# Patient Record
Sex: FEMALE | Race: BLACK OR AFRICAN AMERICAN | Employment: STUDENT | URBAN - METROPOLITAN AREA
[De-identification: names, ages, dates, MRNs, and addresses within clinical notes are randomized per-mention and may not be internally consistent; named-entity substitution may affect disease eponyms.]

---

## 2018-01-31 ENCOUNTER — OFFICE VISIT (OUTPATIENT)
Dept: CARDIOLOGY CLINIC | Age: 18
End: 2018-01-31

## 2018-01-31 VITALS
WEIGHT: 160 LBS | OXYGEN SATURATION: 98 % | HEIGHT: 62 IN | HEART RATE: 85 BPM | DIASTOLIC BLOOD PRESSURE: 69 MMHG | SYSTOLIC BLOOD PRESSURE: 116 MMHG | BODY MASS INDEX: 29.44 KG/M2

## 2018-01-31 DIAGNOSIS — R00.2 PALPITATIONS: Primary | ICD-10-CM

## 2018-01-31 DIAGNOSIS — R07.89 OTHER CHEST PAIN: ICD-10-CM

## 2018-01-31 NOTE — PROGRESS NOTES
Subjective:   Quiana Simms is a 21 y.o. F who presents to the office today for cardiac evaluation. She reports that over the past 3 days, she has been having episodes of chest pressure/tightness associated with shortness of breath that start during/after a test.  She reports that walking, lying down, and applying Toribio's rub to her chest all help alleviate her symptoms. 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 She thinks that these episodes could be related to anxiety. She states that she has panic attacks with the chest tightness and shortness of breath but also with racing thoughts and palpitations. Patient's cardiac risk factors are family history (grandfather with Hx MI in late 52's), stress. There are no active problems to display for this patient. No Known Allergies  No past medical history on file. No past surgical history on file. Family History   Problem Relation Age of Onset    Heart Attack Other      History   Smoking Status    Never Smoker   Smokeless Tobacco    Never Used          Review of Systems, additional:  Constitutional: negative  Eyes: negative  Respiratory: As per HPI  Cardiovascular: As per HPI  Gastrointestinal: negative  Musculoskeletal:negative  Neurological: negative  Behvioral/Psych: As per HPI  Endocrine: negative  ENT: negative    Objective:     Visit Vitals    /69    Pulse 85    Ht 5' 2\" (1.575 m)    Wt 160 lb (72.6 kg)    LMP 12/28/2017    SpO2 98%    BMI 29.26 kg/m2     General:  alert, cooperative, no distress, appears stated age   Chest Wall: inspection normal - no chest wall deformities, respiratory effort normal   Lung: clear to auscultation bilaterally   Heart:  normal rate, regular rhythm, normal S1, S2, no murmurs, rubs, clicks or gallops   Abdomen: soft, non-tender.  No masses,  no organomegaly   Extremities: extremities normal, atraumatic, no cyanosis or edema Skin: no rashes   Neuro: alert, oriented, normal speech, no focal findings or movement disorder noted     EKG: Normal sinus rhythm, rate 71    Assessment/Plan:   Pt is a 20 yo F who presents due to chest tightness. ICD-10-CM ICD-9-CM    1. Palpitations R00.2 785.1 AMB POC EKG ROUTINE W/ 12 LEADS, INTER & REP      STRESS TEST CARDIAC   2. Other chest pain R07.89 786.59 STRESS TEST CARDIAC     Chest pain/tightness: Occurs during times of stress/testing for school. Atypical for coronary disease. EKG obtained today, unremarkable. Exercise stress test ordered. Follow-up in 2-3 weeks, after testing has been completed.

## 2018-01-31 NOTE — PROGRESS NOTES
1. Have you been to the ER, urgent care clinic since your last visit? Hospitalized since your last visit? No    2. Have you seen or consulted any other health care providers outside of the [address] [place] [address] since your last visit? Include any pap smears or colon screening.  No

## 2018-01-31 NOTE — MR AVS SNAPSHOT
[facility] [address] [place] [address] [address] 
[phone] Patient: Nabeel Richardson MRN: [id number] ZQK:[birth year] Visit Information Date & Time Provider Department Dept. Phone Encounter #  
 1/31/2018 11:15 AM Sanford Haywood MD [address] [place] [address] Specialist at [facility] [phone] [id number] Your Appointments 2/12/2018  1:15 PM  
Follow Up with Sanford Haywood MD  
Cardio Specialist at [facility]) Appt Note: after stress test  
 [facility] [address] [address] [address] Upcoming Health Maintenance Date Due Hepatitis A Peds Age 1-18 (1 of 2 - Standard Series) 3/16/1999 DTaP/Tdap/Td series (1 - Tdap) 3/16/2005 HPV AGE 9Y-26Y (1 of 3 - Female 3 Dose Series) 3/16/2009 Influenza Age 5 to Adult 8/1/2017 Allergies as of 1/31/2018  Review Complete On: 1/31/2018 By: Marcia Mary RN No Known Allergies Current Immunizations  Never Reviewed No immunizations on file. Not reviewed this visit You Were Diagnosed With   
  
 Codes Comments Palpitations    -  Primary ICD-10-CM: R00.2 ICD-9-CM: 785.1 Other chest pain     ICD-10-CM: R07.89 ICD-9-CM: 786.59 Vitals BP Pulse Height(growth percentile) Weight(growth percentile) LMP SpO2  
 116/69 (81 %/ 75 %)* 85 5' 2\" (1.575 m) (18 %, Z= -0.90) 160 lb (72.6 kg) (87 %, Z= 1.13) 12/28/2017 98% BMI OB Status Smoking Status 29.26 kg/m2 (92 %, Z= 1.41) Having regular periods Never Smoker *BP percentiles are based on NHBPEP's 4th Report Growth percentiles are based on CDC 2-20 Years data. Vitals History BMI and BSA Data Body Mass Index Body Surface Area  
 29.26 kg/m 2 1.78 m 2 Your Updated Medication List  
  
Notice  As of 1/31/2018 11:59 AM  
 You have not been prescribed any medications. We Performed the Following AMB POC EKG ROUTINE W/ 12 LEADS, INTER & REP [04952 CPT(R)] Introducing [facility] & HEALTH SERVICES! [facility] introduces [employer] patient portal. Now you can access parts of your medical record, email your doctor's office, and request medication refills online. 1. In your internet browser, go to [email or web address] 2. Click on the First Time User? Click Here link in the Sign In box. You will see the New Member Sign Up page. 3. Enter your [employer] Access Code exactly as it appears below. You will not need to use this code after youve completed the sign-up process. If you do not sign up before the expiration date, you must request a new code. · [employer] Access Code: [email or web address] Expires: 5/1/2018 11:27 AM 
 
4. Enter the last four digits of your Social Security Number (xxxx) and Date of Birth (mm/dd/yyyy) as indicated and click Submit. You will be taken to the next sign-up page. 5. Create a [employer]ia ID. This will be your Diagn[employer]ia login ID and cannot be changed, so think of one that is secure and easy to remember. 6. Create a [employer]ia password. You can change your password at any time. 7. Enter your Password Reset Question and Answer. This can be used at a later time if you forget your password. 8. Enter your e-mail address. You will receive e-mail notification when new information is available in [address] 9. Click Sign Up. You can now view and download portions of your medical record. 10. Click the Download Summary menu link to download a portable copy of your medical information. If you have questions, please visit the Frequently Asked Questions section of the [employer] website. Remember, [employer]ia is NOT to be used for urgent needs. For medical emergencies, dial 911. Now available from your iPhone and Android! Please provide this summary of care documentation to your next provider. Your primary care clinician is listed as NONE. If you have any questions after today's visit, please call [phone].

## 2018-02-04 PROBLEM — R07.89 OTHER CHEST PAIN: Status: ACTIVE | Noted: 2018-02-04

## 2018-02-09 ENCOUNTER — TELEPHONE (OUTPATIENT)
Dept: CARDIOLOGY CLINIC | Age: 18
End: 2018-02-09

## 2018-02-09 NOTE — TELEPHONE ENCOUNTER
LMOM for patient to cancel appt on Monday she has not had testing done that Dr. Christiano Bustamante ordered.

## 2018-03-06 ENCOUNTER — OFFICE VISIT (OUTPATIENT)
Dept: INTERNAL MEDICINE CLINIC | Age: 18
End: 2018-03-06

## 2018-03-06 VITALS
OXYGEN SATURATION: 99 % | SYSTOLIC BLOOD PRESSURE: 115 MMHG | RESPIRATION RATE: 15 BRPM | WEIGHT: 157 LBS | HEIGHT: 62 IN | DIASTOLIC BLOOD PRESSURE: 55 MMHG | HEART RATE: 79 BPM | BODY MASS INDEX: 28.89 KG/M2 | TEMPERATURE: 98.3 F

## 2018-03-06 DIAGNOSIS — J45.20 MILD INTERMITTENT ASTHMA, UNSPECIFIED WHETHER COMPLICATED: Primary | ICD-10-CM

## 2018-03-06 DIAGNOSIS — F41.9 ANXIETY: ICD-10-CM

## 2018-03-06 RX ORDER — ALBUTEROL SULFATE 90 UG/1
AEROSOL, METERED RESPIRATORY (INHALATION)
Refills: 0 | COMMUNITY
Start: 2018-02-25

## 2018-03-06 RX ORDER — CETIRIZINE HYDROCHLORIDE, PSEUDOEPHEDRINE HYDROCHLORIDE 5; 120 MG/1; MG/1
1 TABLET, FILM COATED, EXTENDED RELEASE ORAL 2 TIMES DAILY
Qty: 24 TAB | Refills: 4 | Status: SHIPPED | OUTPATIENT
Start: 2018-03-06 | End: 2018-03-07 | Stop reason: SDUPTHER

## 2018-03-06 NOTE — PROGRESS NOTES
FAMILY MEDICINE CLINIC NOTE    S: the patient presents for establishment of care. She states that she had a recent URI with difficulty breathing, wheezing and some mildly productive cough which is worse at night. She has an albuterol inhaler but does not utilize a spacer. She has a history of generalized anxiety. This effects her every day. Intermittent panic attacks that occur in relating to her menstrual period. She has never been treated for anxiety. She would like to try psychotherapy. No angina, dyspnea, edema, temperature intolerance, polyuria or polydipsia         No current outpatient prescriptions on file prior to visit. No current facility-administered medications on file prior to visit. Past Medical History:   Diagnosis Date    Depression        Social History     Social History    Marital status: SINGLE     Spouse name: N/A    Number of children: N/A    Years of education: N/A     Occupational History    Not on file.      Social History Main Topics    Smoking status: Never Smoker    Smokeless tobacco: Never Used    Alcohol use Yes      Comment: occasionally    Drug use: No    Sexual activity: Yes     Birth control/ protection: None, Implant     Other Topics Concern    Not on file     Social History Narrative    No narrative on file       Family History   Problem Relation Age of Onset    Anemia Mother      iron deficiency    Hypertension Father     Other Father      enlarged heart       O:  Visit Vitals    /55    Pulse 79    Temp 98.3 °F (36.8 °C) (Oral)    Resp 15    Ht 5' 1.5\" (1.562 m)    Wt 157 lb (71.2 kg)    SpO2 99%    BMI 29.18 kg/m2     NAD, comfortable  No thyromegaly  No LAD  RRR, no murmurs  CTABL, no wheezing/ronchi/rales  abd soft ND NT normoactive BS  No edema  AAOx3    21 y.o. female      ICD-10-CM ICD-9-CM    1. Mild intermittent asthma, unspecified whether complicated B80.03 809.77 inhalational spacing device      DISCONTINUED: cetirizine-psuedoePHEDrine (ZYRTEC-D) 5-120 mg per tablet   2.  Anxiety F41.9 300.00 REFERRAL TO PSYCHOLOGY

## 2018-03-06 NOTE — MR AVS SNAPSHOT
[address] [place] [address] 
 
 
 [address] [facility] [address] 
[phone] Patient: Abrahan Andersen MRN: [id number] SIF:0[birth year]27/6544 Visit Information Date & Time Provider Department Dept. Phone Encounter #  
 3/6/2018  5:00 PM [address] [phone] [id number] Upcoming Health Maintenance Date Due Hepatitis A Peds Age 1-18 (1 of 2 - Standard Series) 3/15/1999 DTaP/Tdap/Td series (1 - Tdap) 3/15/2005 HPV AGE 9Y-26Y (1 of 3 - Female 3 Dose Series) 3/15/2009 Allergies as of 3/6/2018  Review Complete On: 3/6/2018 By: Amalia Anthony MD  
 No Known Allergies Current Immunizations  Never Reviewed No immunizations on file. Not reviewed this visit You Were Diagnosed With   
  
 Codes Comments Mild intermittent asthma, unspecified whether complicated    -  Primary ICD-10-CM: J45.20 ICD-9-CM: 493.90 Anxiety     ICD-10-CM: F41.9 ICD-9-CM: 300.00 Vitals BP Pulse Temp Resp Height(growth percentile) Weight(growth percentile) 115/55 (80 %/ 28 %)* 79 98.3 °F (36.8 °C) (Oral) 15 5' 1.5\" (1.562 m) (14 %, Z= -1.10) 157 lb (71.2 kg) (85 %, Z= 1.04) LMP SpO2 BMI OB Status Smoking Status 02/27/2018 (Exact Date) 99% 29.18 kg/m2 (92 %, Z= 1.39) Having regular periods Never Smoker *BP percentiles are based on NHBPEP's 4th Report Growth percentiles are based on CDC 2-20 Years data. BMI and BSA Data Body Mass Index Body Surface Area  
 29.18 kg/m 2 1.76 m 2 Preferred Pharmacy Pharmacy Name Phone [id number] [address], [address] [place] [address] [address] [place] [address] [phone] Your Updated Medication List  
  
   
This list is accurate as of 3/6/18  5:11 PM.  Always use your most recent med list.  
  
  
  
  
 Amy Mole EXTRACT(BULK)  
by Does Not Apply route. cetirizine-psuedoePHEDrine 5-120 mg per tablet Commonly known as:  ZyrTEC-D Take 1 Tab by mouth two (2) times a day. DAILY MULTIVITAMIN PO Take  by mouth. FISH -160-1,000 mg Cap Generic drug:  omega 3-dha-epa-fish oil Take  by mouth. inhalational spacing device Use every time you use your inhaler MAGNESIUM PO Take  by mouth. NEXPLANON 68 mg Impl Generic drug:  etonogestrel  
by SubDERmal route. THEANINE PO Take  by mouth. VENTOLIN HFA 90 mcg/actuation inhaler Generic drug:  albuterol INHALE 1 PUFF INHALE EVERY 4 HOURS AS NEEDED (PRN) Prescriptions Sent to Pharmacy Refills  
 inhalational spacing device 0 Sig: Use every time you use your inhaler Class: Normal  
 [id number] [address] [place] [address],[facility] Floor Ph #: [phone]  
 cetirizine-psuedoePHEDrine (ZYRTEC-D) 5-120 mg per tablet 4 Sig: Take 1 Tab by mouth two (2) times a day. Class: Normal  
 Pharmacy: [facility], [address] [place] [address],[address] Ph #: [phone] Route: Oral  
  
We Performed the Following REFERRAL TO PSYCHOLOGY [TUP40 Custom] Comments:  
 Would like counseling services for anxiety DOES NOT WANT PSYCHIATRY REFERRAL AS SHE IS NOT INTERESTED IN MEDICATION MANAGEMENT Referral Information Referral ID Referred By Referred To  
  
 [id number] Sheyla VALDES Not Available Visits Status Start Date End Date 1 New Request 3/6/18 3/6/19 If your referral has a status of pending review or denied, additional information will be sent to support the outcome of this decision. Introducing [facility] & HEALTH SERVICES! Suman Saavedra introduces [employer] patient portal. Now you can access parts of your medical record, email your doctor's office, and request medication refills online. 1. In your internet browser, go to [email or web address] 2. Click on the First Time User? Click Here link in the Sign In box.  You will see the New Member Sign Up page. 3. Enter your [employer] Access Code exactly as it appears below. You will not need to use this code after youve completed the sign-up process. If you do not sign up before the expiration date, you must request a new code. · [employer] Access Code: [email or web address] Expires: 6/4/2018  5:08 PM 
 
4. Enter the last four digits of your Social Security Number (xxxx) and Date of Birth (mm/dd/yyyy) as indicated and click Submit. You will be taken to the next sign-up page. 5. Create a [employer] ID. This will be your [employer] login ID and cannot be changed, so think of one that is secure and easy to remember. 6. Create a [employer] password. You can change your password at any time. 7. Enter your Password Reset Question and Answer. This can be used at a later time if you forget your password. 8. Enter your e-mail address. You will receive e-mail notification when new information is available in [address] 9. Click Sign Up. You can now view and download portions of your medical record. 10. Click the Download Summary menu link to download a portable copy of your medical information. If you have questions, please visit the Frequently Asked Questions section of the [employer] website. Remember, [employer] is NOT to be used for urgent needs. For medical emergencies, dial 911. Now available from your iPhone and Android! Please provide this summary of care documentation to your next provider. If you have any questions after today's visit, please call [phone].

## 2018-03-06 NOTE — PROGRESS NOTES
ROOM # 10    Tye Newsome presents today for   Chief Complaint   Patient presents with    New Patient    Breathing Problem     hx of asthma as a child, cold x1 month ago with lingering mucous       [facility] preferred language for health care discussion is english/other. Is someone accompanying this pt? no    Is the patient using any DME equipment during OV? no    Depression Screening:  PHQ over the last two weeks 3/6/2018   Little interest or pleasure in doing things Several days   Feeling down, depressed or hopeless Nearly every day   Total Score PHQ 2 4       Learning Assessment:  No flowsheet data found. Abuse Screening:  No flowsheet data found. Fall Risk  No flowsheet data found. Health Maintenance reviewed and discussed per provider. Yes    Pt will get peds imm records      Advance Directive:  1. Do you have an advance directive in place? Patient Reply: no    2. If not, would you like material regarding how to put one in place? Patient Reply: no    Coordination of Care:  1. Have you been to the ER, urgent care clinic since your last visit? Hospitalized since your last visit? no    2. Have you seen or consulted any other health care providers outside of the [address] [place] [address] since your last visit? Include any pap smears or colon screening.  no

## 2018-03-07 DIAGNOSIS — J45.20 MILD INTERMITTENT ASTHMA, UNSPECIFIED WHETHER COMPLICATED: ICD-10-CM

## 2018-03-07 RX ORDER — CETIRIZINE HYDROCHLORIDE, PSEUDOEPHEDRINE HYDROCHLORIDE 5; 120 MG/1; MG/1
1 TABLET, FILM COATED, EXTENDED RELEASE ORAL 2 TIMES DAILY
Qty: 24 TAB | Refills: 4 | Status: SHIPPED | OUTPATIENT
Start: 2018-03-07

## 2018-03-07 NOTE — TELEPHONE ENCOUNTER
[facility] pharmacy calling to check on a Prescription. States they did not receive the RX from yesterday, asking that it please be resent.

## 2018-09-20 ENCOUNTER — OFFICE VISIT (OUTPATIENT)
Dept: INTERNAL MEDICINE CLINIC | Age: 18
End: 2018-09-20

## 2018-09-20 VITALS
RESPIRATION RATE: 18 BRPM | DIASTOLIC BLOOD PRESSURE: 76 MMHG | HEIGHT: 62 IN | SYSTOLIC BLOOD PRESSURE: 114 MMHG | BODY MASS INDEX: 30.59 KG/M2 | HEART RATE: 73 BPM | WEIGHT: 166.2 LBS | OXYGEN SATURATION: 98 % | TEMPERATURE: 98.5 F

## 2018-09-20 DIAGNOSIS — K64.9 HEMORRHOIDS, UNSPECIFIED HEMORRHOID TYPE: Primary | ICD-10-CM

## 2018-09-20 RX ORDER — HYDROCORTISONE ACETATE 25 MG/1
25 SUPPOSITORY RECTAL EVERY 12 HOURS
Qty: 100 SUPPOSITORY | Refills: 2 | Status: SHIPPED | OUTPATIENT
Start: 2018-09-20

## 2018-09-20 NOTE — MR AVS SNAPSHOT
[address] [place] [address] 
 
 
 [address] [facility] [address] [address] 
[phone] Patient: Minor Reese MRN: [id number] CSP:[id number] Visit Information Date & Time Provider Department Dept. Phone Encounter #  
 9/20/2018  3:30 PM [address] [phone] [id number] Upcoming Health Maintenance Date Due Hepatitis A Peds Age 1-18 (1 of 2 - Standard Series) 3/16/1999 DTaP/Tdap/Td series (1 - Tdap) 3/16/2005 HPV Age 9Y-34Y (1 of 3 - Female 3 Dose Series) 3/16/2009 Pneumococcal 19-64 Medium Risk (1 of 1 - PPSV23) 3/16/2017 Influenza Age 5 to Adult 8/1/2018 Allergies as of 9/20/2018  Review Complete On: 9/20/2018 By: Brittani Hewitt MD  
 No Known Allergies Current Immunizations  Never Reviewed No immunizations on file. Not reviewed this visit You Were Diagnosed With   
  
 Codes Comments Hemorrhoids, unspecified hemorrhoid type    -  Primary ICD-10-CM: K64.9 ICD-9-CM: 551. 6 Vitals BP Pulse Temp Resp Height(growth percentile) Weight(growth percentile) 114/76 (BP 1 Location: Right arm, BP Patient Position: Sitting) 73 98.5 °F (36.9 °C) (Oral) 18 5' 1.5\" (1.562 m) 166 lb 3.2 oz (75.4 kg) LMP SpO2 BMI OB Status Smoking Status 09/06/2018 (Approximate) 98% 30.89 kg/m2 Having regular periods Never Smoker Vitals History BMI and BSA Data Body Mass Index Body Surface Area  
 30.89 kg/m 2 1.81 m 2 Preferred Pharmacy Pharmacy Name Phone [id number] [address] [place] [address] [phone] Your Updated Medication List  
  
   
This list is accurate as of 9/20/18  3:54 PM.  Always use your most recent med list.  
  
  
  
  
 Ladean Presser EXTRACT(BULK)  
by Does Not Apply route. cetirizine-psuedoePHEDrine 5-120 mg per tablet Commonly known as:  ZyrTEC-D Take 1 Tab by mouth two (2) times a day. DAILY MULTIVITAMIN PO Take  by mouth. FISH -160-1,000 mg Cap Generic drug:  omega 3-dha-epa-fish oil Take  by mouth. hydrocortisone 25 mg Supp Commonly known as:  ANUSOL-HC Insert 1 Suppository into rectum every twelve (12) hours. inhalational spacing device Use every time you use your inhaler MAGNESIUM PO Take  by mouth. NEXPLANON 68 mg Impl Generic drug:  etonogestrel  
by SubDERmal route. THEANINE PO Take  by mouth. VENTOLIN HFA 90 mcg/actuation inhaler Generic drug:  albuterol INHALE 1 PUFF INHALE EVERY 4 HOURS AS NEEDED (PRN) Prescriptions Sent to Pharmacy Refills  
 hydrocortisone (ANUSOL-HC) 25 mg supp 2 Sig: Insert 1 Suppository into rectum every twelve (12) hours. Class: Normal  
 Pharmacy: [facility] [address] [facility]  #: [phone] Route: Rectal  
  
Patient Instructions Hemorrhoids: Care Instructions Your Care Instructions Hemorrhoids are enlarged veins that develop in the anal canal. Bleeding during bowel movements, itching, swelling, and rectal pain are the most common symptoms. They can be uncomfortable at times, but hemorrhoids rarely are a serious problem. You can treat most hemorrhoids with simple changes to your diet and bowel habits. These changes include eating more fiber and not straining to pass stools. Most hemorrhoids do not need surgery or other treatment unless they are very large and painful or bleed a lot. Follow-up care is a key part of your treatment and safety. Be sure to make and go to all appointments, and call your doctor if you are having problems. It's also a good idea to know your test results and keep a list of the medicines you take. How can you care for yourself at home? · Sit in a few inches of warm water (sitz bath) 3 times a day and after bowel movements. The warm water helps with pain and itching. · Put ice on your anal area several times a day for 10 minutes at a time. Put a thin cloth between the ice and your skin. Follow this by placing a warm, wet towel on the area for another 10 to 20 minutes. · Take pain medicines exactly as directed. ¨ If the doctor gave you a prescription medicine for pain, take it as prescribed. ¨ If you are not taking a prescription pain medicine, ask your doctor if you can take an over-the-counter medicine. · Keep the anal area clean, but be gentle. Use water and a fragrance-free soap, such as [place], or use baby wipes or medicated pads, such as Tucks. · Wear cotton underwear and loose clothing to decrease moisture in the anal area. · Eat more fiber. Include foods such as whole-grain breads and cereals, raw vegetables, raw and dried fruits, and beans. · Drink plenty of fluids, enough so that your urine is light yellow or clear like water. If you have kidney, heart, or liver disease and have to limit fluids, talk with your doctor before you increase the amount of fluids you drink. · Use a stool softener that contains bran or psyllium. You can save money by buying bran or psyllium (available in bulk at most health food stores) and sprinkling it on foods or stirring it into fruit juice. Or you can use a product such as Metamucil or Hydrocil. · Practice healthy bowel habits. ¨ Go to the bathroom as soon as you have the urge. ¨ Avoid straining to pass stools. Relax and give yourself time to let things happen naturally. ¨ Do not hold your breath while passing stools. ¨ Do not read while sitting on the toilet. Get off the toilet as soon as you have finished. · Take your medicines exactly as prescribed. Call your doctor if you think you are having a problem with your medicine. When should you call for help? Call 911 anytime you think you may need emergency care. For example, call if: 
  · You pass maroon or very bloody stools.  Call your doctor now or seek immediate medical care if: 
  · You have increased pain.  
  · You have increased bleeding.  
 Watch closely for changes in your health, and be sure to contact your doctor if: 
  · Your symptoms have not improved after 3 or 4 days. Where can you learn more? Go to http://melissa-hellen.info/. Enter F228 in the search box to learn more about \"Hemorrhoids: Care Instructions. \" Current as of: May 12, 2017 Content Version: 11.7 © 2199-6944 [employer]. Care instructions adapted under license by [employer] (which disclaims liability or warranty for this information). If you have questions about a medical condition or this instruction, always ask your healthcare professional. [address] any warranty or liability for your use of this information. Introducing [facility]! [employer] introduces [employer] patient portal. Now you can access parts of your medical record, email your doctor's office, and request medication refills online. 1. In your internet browser, go to [email or web address] 2. Click on the First Time User? Click Here link in the Sign In box. You will see the New Member Sign Up page. 3. Enter your [employer] Access Code exactly as it appears below. You will not need to use this code after youve completed the sign-up process. If you do not sign up before the expiration date, you must request a new code. · [employer] Access Code: [email or web address] Expires: 12/19/2018  3:44 PM 
 
4. Enter the last four digits of your Social Security Number (xxxx) and Date of Birth (mm/dd/yyyy) as indicated and click Submit. You will be taken to the next sign-up page. 5. Create a [employer] ID. This will be your [employer] login ID and cannot be changed, so think of one that is secure and easy to remember. 6. Create a [employer] password. You can change your password at any time. 7. Enter your Password Reset Question and Answer. This can be used at a later time if you forget your password. 8. Enter your e-mail address. You will receive e-mail notification when new information is available in [address] 9. Click Sign Up. You can now view and download portions of your medical record. 10. Click the Download Summary menu link to download a portable copy of your medical information. If you have questions, please visit the Frequently Asked Questions section of the [employer] website. Remember, [employer] is NOT to be used for urgent needs. For medical emergencies, dial 911. Now available from your iPhone and Android! Please provide this summary of care documentation to your next provider. Your primary care clinician is listed as NONE. If you have any questions after today's visit, please call [phone].

## 2018-09-20 NOTE — PROGRESS NOTES
ROOM # 10    Raymond Romo presents today for   Chief Complaint   Patient presents with    Anal Bleeding     x 3 weeks, hemmorhoid per pt       Raymond Romo preferred language for health care discussion is english/other. Is someone accompanying this pt? yes    Is the patient using any DME equipment during OV? No      Depression Screening:  PHQ over the last two weeks 3/6/2018   Little interest or pleasure in doing things Several days   Feeling down, depressed, irritable, or hopeless Nearly every day   Total Score PHQ 2 4       Learning Assessment 9/20/2018   PRIMARY LEARNER Patient   HIGHEST LEVEL OF EDUCATION - PRIMARY LEARNER  SOME COLLEGE   BARRIERS PRIMARY LEARNER NONE   CO-LEARNER CAREGIVER No   PRIMARY LANGUAGE ENGLISH   LEARNER PREFERENCE PRIMARY DEMONSTRATION   ANSWERED BY Patient   RELATIONSHIP SELF       Health Maintenance reviewed and discussed per provider. Yes    Raymond Romo is due for   Health Maintenance Due   Topic Date Due    Hepatitis A Peds Age 1-18 (1 of 2 - Standard Series) 03/16/1999    DTaP/Tdap/Td series (1 - Tdap) 03/16/2005    HPV Age 9Y-34Y (1 of 3 - Female 3 Dose Series) 03/16/2009    Pneumococcal 19-64 Medium Risk (1 of 1 - PPSV23) 03/16/2017    Influenza Age 9 to Adult  08/01/2018     Please order/place referral if appropriate. Advance Directive:  1. Do you have an advance directive in place? Patient Reply: no    2. If not, would you like material regarding how to put one in place? Patient Reply: no    Coordination of Care:  1. Have you been to the ER, urgent care clinic since your last visit? Hospitalized since your last visit? no    2. Have you seen or consulted any other health care providers outside of the [address] [place] [address] since your last visit? Include any pap smears or colon screening.  no

## 2018-09-20 NOTE — PROGRESS NOTES
FAMILY MEDICINE CLINIC NOTE    S: The patient presents with a recurrence of an internal hemorrhoid with bleeding per rectum for the last 3 weeks, worse over the last few days. She has tried topical hydrocortisine and hydrocortisone suppositories without any relief. She has not tried sitz baths. She has not tried stool softeners or supplemental fiber. Current Outpatient Prescriptions on File Prior to Visit   Medication Sig Dispense Refill    cetirizine-psuedoePHEDrine (ZYRTEC-D) 5-120 mg per tablet Take 1 Tab by mouth two (2) times a day. 24 Tab 4    VENTOLIN HFA 90 mcg/actuation inhaler INHALE 1 PUFF INHALE EVERY 4 HOURS AS NEEDED (PRN)  0    omega 3-dha-epa-fish oil (FISH OIL) 100-160-1,000 mg cap Take  by mouth.  MV-MIN/FOLIC/VIT E/CTQNV/DGH94 (DAILY MULTIVITAMIN PO) Take  by mouth.  ASHWAGANDHA ROOT EXTRACT,BULK, by Does Not Apply route.  etonogestrel (NEXPLANON) 68 mg impl by SubDERmal route.  MAGNESIUM PO Take  by mouth.  THEANINE PO Take  by mouth.  inhalational spacing device Use every time you use your inhaler 1 Device 0     No current facility-administered medications on file prior to visit. Past Medical History:   Diagnosis Date    Depression        Social History     Social History    Marital status: SINGLE     Spouse name: N/A    Number of children: N/A    Years of education: N/A     Occupational History    Not on file.      Social History Main Topics    Smoking status: Never Smoker    Smokeless tobacco: Never Used    Alcohol use Yes      Comment: Occasional    Drug use: No    Sexual activity: Yes     Birth control/ protection: None, Implant     Other Topics Concern    Not on file     Social History Narrative    ** Merged History Encounter **            Family History   Problem Relation Age of Onset    Anemia Mother      iron deficiency    Hypertension Father     Other Father      enlarged heart    Heart Attack Other        O:  Visit Vitals    BP 114/76 (BP 1 Location: Right arm, BP Patient Position: Sitting)    Pulse 73    Temp 98.5 °F (36.9 °C) (Oral)    Resp 18    Ht 5' 1.5\" (1.562 m)    Wt 166 lb 3.2 oz (75.4 kg)    SpO2 98%    BMI 30.89 kg/m2     NAD, comfortable  Chaperone: Harvey Sky  Anal exam demonstrates non-inflamed internal and external hemorrhoid    19 y.o. female      ICD-10-CM ICD-9-CM    1. Hemorrhoids, unspecified hemorrhoid type K64.9 455.6 hydrocortisone (ANUSOL-HC) 25 mg supp  Sitz baths  Increase fiber and water intake  Decrease BM sitting time  Follow up PRN if no improvement.

## 2018-09-20 NOTE — PATIENT INSTRUCTIONS

## 2022-01-14 ENCOUNTER — APPOINTMENT (OUTPATIENT)
Dept: ULTRASOUND IMAGING | Age: 22
End: 2022-01-14
Payer: COMMERCIAL

## 2022-01-14 ENCOUNTER — HOSPITAL ENCOUNTER (EMERGENCY)
Age: 22
Discharge: HOME | End: 2022-01-14
Attending: OBSTETRICS & GYNECOLOGY | Admitting: OBSTETRICS & GYNECOLOGY
Payer: COMMERCIAL

## 2022-01-14 VITALS
TEMPERATURE: 98.6 F | SYSTOLIC BLOOD PRESSURE: 101 MMHG | BODY MASS INDEX: 34.23 KG/M2 | HEART RATE: 79 BPM | WEIGHT: 186 LBS | DIASTOLIC BLOOD PRESSURE: 60 MMHG | HEIGHT: 62 IN | OXYGEN SATURATION: 100 %

## 2022-01-14 LAB
APPEARANCE UR: CLEAR
APPEARANCE UR: CLEAR
BASOPHILS # BLD: 0 K/UL (ref 0–0.1)
BASOPHILS NFR BLD: 0 % (ref 0–2)
BILIRUB UR QL: NEGATIVE
BILIRUB UR QL: NEGATIVE
COLOR UR: NORMAL
COLOR UR: YELLOW
DIFFERENTIAL METHOD BLD: ABNORMAL
EOSINOPHIL # BLD: 0.1 K/UL (ref 0–0.4)
EOSINOPHIL NFR BLD: 2 % (ref 0–5)
ERYTHROCYTE [DISTWIDTH] IN BLOOD BY AUTOMATED COUNT: 11.9 % (ref 11.6–14.5)
GLUCOSE UR QL STRIP.AUTO: NEGATIVE MG/DL
GLUCOSE UR STRIP.AUTO-MCNC: NEGATIVE MG/DL
HCT VFR BLD AUTO: 36.6 % (ref 35–45)
HGB BLD-MCNC: 12.1 G/DL (ref 12–16)
HGB UR QL STRIP: NEGATIVE
IMM GRANULOCYTES # BLD AUTO: 0 K/UL (ref 0–0.04)
IMM GRANULOCYTES NFR BLD AUTO: 1 % (ref 0–0.5)
KETONES UR QL STRIP.AUTO: NEGATIVE MG/DL
KETONES UR-MCNC: NEGATIVE MG/DL
LEUKOCYTE ESTERASE UR QL STRIP.AUTO: NEGATIVE
LEUKOCYTE ESTERASE UR QL STRIP: NEGATIVE
LYMPHOCYTES # BLD: 1.1 K/UL (ref 0.9–3.6)
LYMPHOCYTES NFR BLD: 19 % (ref 21–52)
MCH RBC QN AUTO: 31.6 PG (ref 24–34)
MCHC RBC AUTO-ENTMCNC: 33.1 G/DL (ref 31–37)
MCV RBC AUTO: 95.6 FL (ref 78–100)
MONOCYTES # BLD: 0.8 K/UL (ref 0.05–1.2)
MONOCYTES NFR BLD: 13 % (ref 3–10)
NEUTS SEG # BLD: 3.8 K/UL (ref 1.8–8)
NEUTS SEG NFR BLD: 65 % (ref 40–73)
NITRITE UR QL STRIP.AUTO: NEGATIVE
NITRITE UR QL: NEGATIVE
NRBC # BLD: 0 K/UL (ref 0–0.01)
NRBC BLD-RTO: 0 PER 100 WBC
PH UR STRIP: 6.5 [PH] (ref 5–8)
PH UR: 6.5 [PH] (ref 5–9)
PLATELET # BLD AUTO: 207 K/UL (ref 135–420)
PMV BLD AUTO: 10.1 FL (ref 9.2–11.8)
PROT UR QL: NEGATIVE MG/DL
PROT UR STRIP-MCNC: NEGATIVE MG/DL
RBC # BLD AUTO: 3.83 M/UL (ref 4.2–5.3)
RBC # UR STRIP: NEGATIVE /UL
SARS-COV-2, COV2: NORMAL
SERVICE CMNT-IMP: NORMAL
SP GR UR REFRACTOMETRY: 1.01 (ref 1–1.03)
SP GR UR: 1.01 (ref 1–1.02)
UROBILINOGEN UR QL STRIP.AUTO: 0.2 EU/DL (ref 0.2–1)
UROBILINOGEN UR QL: 0.2 EU/DL (ref 0.2–1)
WBC # BLD AUTO: 5.9 K/UL (ref 4.6–13.2)

## 2022-01-14 PROCEDURE — 85025 COMPLETE CBC W/AUTO DIFF WBC: CPT

## 2022-01-14 PROCEDURE — 81003 URINALYSIS AUTO W/O SCOPE: CPT

## 2022-01-14 PROCEDURE — 74011250637 HC RX REV CODE- 250/637: Performed by: STUDENT IN AN ORGANIZED HEALTH CARE EDUCATION/TRAINING PROGRAM

## 2022-01-14 PROCEDURE — 74011000250 HC RX REV CODE- 250: Performed by: STUDENT IN AN ORGANIZED HEALTH CARE EDUCATION/TRAINING PROGRAM

## 2022-01-14 PROCEDURE — 59025 FETAL NON-STRESS TEST: CPT

## 2022-01-14 PROCEDURE — U0003 INFECTIOUS AGENT DETECTION BY NUCLEIC ACID (DNA OR RNA); SEVERE ACUTE RESPIRATORY SYNDROME CORONAVIRUS 2 (SARS-COV-2) (CORONAVIRUS DISEASE [COVID-19]), AMPLIFIED PROBE TECHNIQUE, MAKING USE OF HIGH THROUGHPUT TECHNOLOGIES AS DESCRIBED BY CMS-2020-01-R: HCPCS

## 2022-01-14 PROCEDURE — 99284 EMERGENCY DEPT VISIT MOD MDM: CPT

## 2022-01-14 PROCEDURE — 76770 US EXAM ABDO BACK WALL COMP: CPT

## 2022-01-14 RX ORDER — LIDOCAINE 4 G/100G
PATCH TOPICAL
Qty: 14 PATCH | Refills: 0 | Status: SHIPPED | OUTPATIENT
Start: 2022-01-15

## 2022-01-14 RX ORDER — CYCLOBENZAPRINE HCL 10 MG
10 TABLET ORAL ONCE
Status: COMPLETED | OUTPATIENT
Start: 2022-01-14 | End: 2022-01-14

## 2022-01-14 RX ORDER — CHOLECALCIFEROL (VITAMIN D3) 50 MCG
3 CAPSULE ORAL DAILY
COMMUNITY

## 2022-01-14 RX ORDER — LIDOCAINE 4 G/100G
1 PATCH TOPICAL EVERY 24 HOURS
Status: DISCONTINUED | OUTPATIENT
Start: 2022-01-14 | End: 2022-01-14 | Stop reason: HOSPADM

## 2022-01-14 RX ADMIN — CYCLOBENZAPRINE 10 MG: 10 TABLET, FILM COATED ORAL at 07:29

## 2022-01-14 NOTE — PROGRESS NOTES
Pt arrived to L&D triage with c/o ctx, pt stated they started on 2022 around 5pm, Pt stated they are coming approximately every 5 minutes, she rates her pain a  9 /10. She is a  and is 21 weeks.

## 2022-01-14 NOTE — PROGRESS NOTES
12- Assumed care of pt. From [facility], RN    8696- RASHEL Jason CNM called and notified of pt arrival and complaints, EFM tracing reviewed. 12- RASHEL Jason CNM at bedside. To discuss POC with MD Barb Uribe. [address] ordered. Order received to d/c EFM/TOCO at this time. Eva 49 at bedside. 1355- Labs drawn. 8201- Bedside and Verbal shift change report given Jailene Obando RN (oncoming nurse) by LISA Puentes (offgoing nurse). Report included the following information SBAR, Kardex, Procedure Summary, Intake/Output, MAR, Accordion, Recent Results and Med Rec Status. All patient care relinquished at this time.

## 2022-01-14 NOTE — PROGRESS NOTES
HPI:  Pt presented to L&D with c/o cramping/back pain that started on  at approx 1700. Pain is intermittent and is a 9/10 pain rating. 21 5/7 wks gestation . Denies vaginal bleeding, + FM, or LOF. Subjective:     [unfilled], 19 y.o.   at 21w5d presents to L&D with c/o back and abdominal pain since 1700, intermittent. Assessment:  Past Medical History:   Diagnosis Date    Asthma     Chlamydia     Depression      Past Surgical History:   Procedure Laterality Date    HX OTHER SURGICAL      sty removal        No Known Allergies  Prior to Admission medications    Medication Sig Start Date End Date Taking? Authorizing Provider   SURIinfantis-SURIani-B.long-BSilveriobifi (Probiotic 4X) 10-15 mg TbEC Take 3 Units by mouth daily. Yes Provider, Historical   MV-MIN/FOLIC/VIT K/KAEMC/QGP19 (DAILY MULTIVITAMIN PO) Take  by mouth. Yes Provider, Historical   hydrocortisone (ANUSOL-HC) 25 mg supp Insert 1 Suppository into rectum every twelve (12) hours. Patient not taking: Reported on 2022   Perla Araiza MD   cetirizine-psuedoePHEDrine (ZYRTEC-D) 5-120 mg per tablet Take 1 Tab by mouth two (2) times a day. Patient not taking: Reported on 2022 3/7/18   Perla Araiza MD   VENTOLIN HFA 90 mcg/actuation inhaler INHALE 1 PUFF INHALE EVERY 4 HOURS AS NEEDED (PRN) 18   Provider, Historical   omega 3-dha-epa-fish oil (FISH OIL) 100-160-1,000 mg cap Take  by mouth. Patient not taking: Reported on 2022    Provider, Historical   MAGNESIUM PO Take  by mouth. Patient not taking: Reported on 2022    Provider, Historical   THEANINE PO Take  by mouth. Patient not taking: Reported on 2022    Provider, Historical   ASHWAGANDHA ROOT Mitcheal Randell, by Does Not Apply route. Patient not taking: Reported on 2022    Provider, Historical   etonogestrel (NEXPLANON) 68 mg impl by SubDERmal route.   Patient not taking: Reported on 2022    Provider, Historical   inhalational spacing device Use every time you use your inhaler  Patient not taking: Reported on 1/14/2022 3/6/18   Penelope Gomez MD        Objective:     Vitals:  Vitals:    01/14/22 0227 01/14/22 0231   BP:  (!) 103/58   Pulse:  84   Temp:  99 °F (37.2 °C)   SpO2:  100%   Weight: 84.4 kg (186 lb)    Height: 5' 2\" (1.575 m)         Physical Exam:  Patient without distress. Heart: Regular rate and rhythm or without murmur or extra heart sounds  Lung: clear to auscultation throughout lung fields, no wheezes, no rales, no rhonchi and normal respiratory effort  Back: costovertebral angle tenderness present  Abdomen: soft, nontender  Fundus: soft and non tender  Perineum: blood absent, amniotic fluid absent  Membranes:  Intact  Fetal Heart Rate: Reactive  Baseline: 150 per minute  Variability: moderate  Decelerations: none  Uterine contractions: none  Cervical exam: Dilated:  deferred                             U/A: Urine dipstick shows negative for all components.     Assessment/Plan:     Plan: Bedside renal ultrasound to r/o kidney stone    Signed By:  Gonzalo Rodriguez CNM     January 14, 2022

## 2022-01-14 NOTE — PROGRESS NOTES
S:  Patient seen and evaluated. She reports pain started around 5p yesterday in back. She went to the gym around 8p to try and walk it off, but it did not improve. She denies urinary issues, no blood in urine. Having normal BM, last just an hour before her pain started. She denies bleeding, leaking, contractions. O:  Visit Vitals  /60   Pulse 79   Temp 99 °F (37.2 °C)   Ht 5' 2\" (1.575 m)   Wt 84.4 kg (186 lb)   SpO2 100%   BMI 34.02 kg/m²     Gen: NAD  Abd: Soft, mild midline tenderness, no lateral tenderness, no guarding or rebound. No RUQ tenderness  R CVA Tender to very superficial palpation, not worsened with percussion. : Spec exam: Yeast-like discharge. Cervix visually closed. No bleeding or leakage of fluid    A/P:   21w5d here with likely msk pain    CBC normal    UA neg, UCx pending  RPUS negative for hydronephrosis, stone, agree low likelihood for this process. Trial flexeril 10mg x1 now. If does not improve pain, can consider lidocaine patch. Can consider abdominal ultrasound, but low suspicion for intraabominal process.

## 2022-01-14 NOTE — PROGRESS NOTES
6249 Bedside and Verbal shift change report given to [address] [place] [address] Dr RN  (oncoming nurse) by Kathryn Suarez RN (offgoing nurse). Report included the following information SBAR, Kardex, Intake/Output, MAR, Recent Results and Med Rec Status.  Dr Reyna Chowdary at bedside. Speculum exam done. Md recommended over the counter Monistat and prescription will be sent to her pharmacy. 830 Pt feels no relief of pain from Flexeril and only temporary relief of pain from heat pack. Received orders for Lidocaine patch from Dr Reyna Chowdary if Flexeril not effective treating her pain. 26 Educated pt re: fetal kick counts, sign & symptoms of  labor, covid precautions, symptoms to report to Md, and when to come back to hospital,  instructed to follow up in [address] office. Pt verbalized understanding.

## 2022-01-14 NOTE — PROGRESS NOTES
Patient reports that lidocaine patch has helped with pain. She received breakfast but reports that she cannot taste it. She has had cold symptoms, along with her partner. Asked RN to collect COVID PCR test.     If positive will follow up via telehealth. Otherwise ok for discharge at this time. Follow up in 1 week. UCx pending.

## 2022-01-14 NOTE — DISCHARGE INSTRUCTIONS
Patient Education   Patient Education        Learning About When to Call Your Doctor During Pregnancy (After 20 Weeks)  Overview  It's common to have concerns about what might be a problem when you're pregnant. Most pregnancies don't have any serious problems. But it's still important to know when to call your doctor if you have certain symptoms or signs of labor. These are general suggestions. Your doctor may give you some more information about when to call. When to call your doctor (after 20 weeks)  Call 911  anytime you think you may need emergency care. For example, call if:  · You have severe vaginal bleeding. · You have sudden, severe pain in your belly. · You passed out (lost consciousness). · You have a seizure. · You see or feel the umbilical cord. · You think you are about to deliver your baby and can't make it safely to the hospital.  Call your doctor now or seek immediate medical care if:  · You have vaginal bleeding. · You have belly pain. · You have a fever. · You have symptoms of preeclampsia, such as:  ? Sudden swelling of your face, hands, or feet. ? New vision problems (such as dimness, blurring, or seeing spots). ? A severe headache. · You have a sudden release of fluid from your vagina. (You think your water broke.)  · You think that you may be in labor. This means that you've had at least 6 contractions in an hour. · You notice that your baby has stopped moving or is moving much less than normal.  · You have symptoms of a urinary tract infection. These may include:  ? Pain or burning when you urinate. ? A frequent need to urinate without being able to pass much urine. ? Pain in the flank, which is just below the rib cage and above the waist on either side of the back. ? Blood in your urine. Watch closely for changes in your health, and be sure to contact your doctor if:  · You have vaginal discharge that smells bad. · You have skin changes, such as:  ?  A rash.  ? Itching. ? Yellow color to your skin. · You have other concerns about your pregnancy. If you have labor signs at 37 weeks or more  If you have signs of labor at 37 weeks or more, your doctor may tell you to call when your labor becomes more active. Symptoms of active labor include:  · Contractions that are regular. · Contractions that are less than 5 minutes apart. · Contractions that are hard to talk through. Follow-up care is a key part of your treatment and safety. Be sure to make and go to all appointments, and call your doctor if you are having problems. It's also a good idea to know your test results and keep a list of the medicines you take. Where can you learn more? Go to http://www.gray.com/  Enter N531 in the search box to learn more about \"Learning About When to Call Your Doctor During Pregnancy (After 20 Weeks). \"  Current as of: June 16, 2021               Content Version: 13.0  © 2006-2021 [employer]. Care instructions adapted under license by [employer] (which disclaims liability or warranty for this information). If you have questions about a medical condition or this instruction, always ask your healthcare professional. [place] [address] any warranty or liability for your use of this information. Learning About COVID-19 and Flu Symptoms  How can you tell COVID-19 from the flu? COVID-19 and the flu have similar symptoms. The two can be hard to tell apart. The only way to know for sure which illness you have is to be tested. Since the symptoms are so alike, it makes sense to act as if you have COVID-19 until your test results come back. This means staying home and limiting contact with people in your home. You'll need to wash your hands often and disinfect surfaces that you touch. And be sure to wear a mask when you're around other people.  This is also good advice if you think you have the flu.  COVID-19 and the flu have these symptoms in common:  · Fever or chills  · Cough  · Shortness of breath  · Fatigue (tiredness)  · Sore throat  · Runny or stuffy nose  · Muscle and body aches  · Headache  · Vomiting and diarrhea (more common in children than adults)  COVID-19 has another symptom that also may occur:  · New loss of taste or smell  COVID-19 symptoms may appear from 2 to 14 days after infection. Flu symptoms usually appear 1 to 4 days after infection. Why should you get a flu shot during the COVID-19 pandemic? It's important to get your yearly flu vaccine. Both the flu and COVID-19 can be active at the same time. You can get sick with both infections at once. And having both may make you more sick than getting just one. The flu vaccine won't protect you from COVID-19. But it can help prevent the flu or reduce its symptoms. If fewer people get very ill with the flu, this will help free up medical resources that are needed for COVID-19 patients. Where can you learn more? Go to http://www.[employer].com/  Enter C123 in the search box to learn more about \"Learning About COVID-19 and Flu Symptoms. \"  Current as of: March 26, 2021               Content Version: 13.0  © 2006-2021 [employer]. Care instructions adapted under license by [employer] (which disclaims liability or warranty for this information). If you have questions about a medical condition or this instruction, always ask your healthcare professional. [place] [address] any warranty or liability for your use of this information.

## 2022-01-14 NOTE — PROGRESS NOTES
Ante Partum Triage Note    Lucina Sparks  [id number]    Assessment: 21w5d                          Right sided abdominal and right sided back pain      Patient states she does have moderate abdominal pain and normal fetal movement and does not have headache , contractions, right upper quadrant pain  , vaginal bleeding , swelling and vaginal leaking of fluid     Vitals:  Visit Vitals  BP (!) 103/58   Pulse 84   Temp 99 °F (37.2 °C)   Ht 5' 2\" (1.575 m)   Wt 84.4 kg (186 lb)   SpO2 100%   BMI 34.02 kg/m²     Temp (24hrs), Av °F (37.2 °C), Min:99 °F (37.2 °C), Max:99 °F (37.2 °C)      Last 24hr Input/Output:  No intake or output data in the 24 hours ending 22         Exam:  Patient without distress.      Abdomen, fundus soft non-tender                CVA tenderness to right side                Negative Rovsing sign                No pain at McBurneys point on palpation     Extremities, no redness or tenderness                    Afebrile, No N/V       Labs: Renal US negative for calculi        Plan:  Laboratory evaluation: Blood work CBC w/ diff stat    Consulted with Dr. Saintclair Ill and she is aware of [facility]  2022  6:28 AM

## 2022-01-15 LAB — SARS-COV-2, COV2NT: DETECTED

## 2022-01-17 NOTE — PROGRESS NOTES
Late entry. Called pt Sunday 1/16/22 to let her know of positive result. She reports that she is feeling better. Reports back pain also better at this time.

## 2022-05-06 ENCOUNTER — HOSPITAL ENCOUNTER (INPATIENT)
Age: 22
LOS: 3 days | Discharge: HOME | End: 2022-05-09
Attending: STUDENT IN AN ORGANIZED HEALTH CARE EDUCATION/TRAINING PROGRAM | Admitting: STUDENT IN AN ORGANIZED HEALTH CARE EDUCATION/TRAINING PROGRAM
Payer: COMMERCIAL

## 2022-05-06 DIAGNOSIS — Z98.891 S/P CESAREAN SECTION: Primary | ICD-10-CM

## 2022-05-06 PROBLEM — Z3A.37 37 WEEKS GESTATION OF PREGNANCY: Status: ACTIVE | Noted: 2022-05-06

## 2022-05-06 LAB
ABO + RH BLD: NORMAL
APPEARANCE UR: CLEAR
BASOPHILS # BLD: 0 K/UL (ref 0–0.1)
BASOPHILS NFR BLD: 0 % (ref 0–2)
BILIRUB UR QL: NEGATIVE
BLOOD GROUP ANTIBODIES SERPL: NORMAL
COLOR UR: YELLOW
DIFFERENTIAL METHOD BLD: ABNORMAL
EOSINOPHIL # BLD: 0.1 K/UL (ref 0–0.4)
EOSINOPHIL NFR BLD: 2 % (ref 0–5)
ERYTHROCYTE [DISTWIDTH] IN BLOOD BY AUTOMATED COUNT: 14.2 % (ref 11.6–14.5)
GLUCOSE UR QL STRIP.AUTO: NEGATIVE MG/DL
HCT VFR BLD AUTO: 36.5 % (ref 35–45)
HGB BLD-MCNC: 11.5 G/DL (ref 12–16)
IMM GRANULOCYTES # BLD AUTO: 0.1 K/UL (ref 0–0.04)
IMM GRANULOCYTES NFR BLD AUTO: 1 % (ref 0–0.5)
KETONES UR-MCNC: NEGATIVE MG/DL
LEUKOCYTE ESTERASE UR QL STRIP: ABNORMAL
LYMPHOCYTES # BLD: 1.6 K/UL (ref 0.9–3.6)
LYMPHOCYTES NFR BLD: 21 % (ref 21–52)
MCH RBC QN AUTO: 30.6 PG (ref 24–34)
MCHC RBC AUTO-ENTMCNC: 31.5 G/DL (ref 31–37)
MCV RBC AUTO: 97.1 FL (ref 78–100)
MONOCYTES # BLD: 0.7 K/UL (ref 0.05–1.2)
MONOCYTES NFR BLD: 9 % (ref 3–10)
NEUTS SEG # BLD: 5.3 K/UL (ref 1.8–8)
NEUTS SEG NFR BLD: 68 % (ref 40–73)
NITRITE UR QL: NEGATIVE
NRBC # BLD: 0 K/UL (ref 0–0.01)
NRBC BLD-RTO: 0 PER 100 WBC
PH UR: 6.5 [PH] (ref 5–9)
PLATELET # BLD AUTO: 211 K/UL (ref 135–420)
PMV BLD AUTO: 11 FL (ref 9.2–11.8)
PROT UR QL: NEGATIVE MG/DL
RBC # BLD AUTO: 3.76 M/UL (ref 4.2–5.3)
RBC # UR STRIP: ABNORMAL /UL
SERVICE CMNT-IMP: ABNORMAL
SP GR UR: 1.01 (ref 1–1.02)
SPECIMEN EXP DATE BLD: NORMAL
UROBILINOGEN UR QL: 0.2 EU/DL (ref 0.2–1)
WBC # BLD AUTO: 7.8 K/UL (ref 4.6–13.2)

## 2022-05-06 PROCEDURE — 59200 INSERT CERVICAL DILATOR: CPT

## 2022-05-06 PROCEDURE — 81003 URINALYSIS AUTO W/O SCOPE: CPT

## 2022-05-06 PROCEDURE — 85025 COMPLETE CBC W/AUTO DIFF WBC: CPT

## 2022-05-06 PROCEDURE — 74011250636 HC RX REV CODE- 250/636: Performed by: STUDENT IN AN ORGANIZED HEALTH CARE EDUCATION/TRAINING PROGRAM

## 2022-05-06 PROCEDURE — 74011250637 HC RX REV CODE- 250/637: Performed by: ADVANCED PRACTICE MIDWIFE

## 2022-05-06 PROCEDURE — 86900 BLOOD TYPING SEROLOGIC ABO: CPT

## 2022-05-06 PROCEDURE — 77030028565 HC CATH CERV RIPNG BLN COOK -B

## 2022-05-06 PROCEDURE — 65270000029 HC RM PRIVATE

## 2022-05-06 RX ORDER — TERBUTALINE SULFATE 1 MG/ML
0.25 INJECTION SUBCUTANEOUS
Status: DISCONTINUED | OUTPATIENT
Start: 2022-05-06 | End: 2022-05-07 | Stop reason: HOSPADM

## 2022-05-06 RX ORDER — METHYLERGONOVINE MALEATE 0.2 MG/ML
0.2 INJECTION INTRAVENOUS AS NEEDED
Status: DISCONTINUED | OUTPATIENT
Start: 2022-05-06 | End: 2022-05-07 | Stop reason: HOSPADM

## 2022-05-06 RX ORDER — OXYTOCIN/RINGER'S LACTATE 30/500 ML
10 PLASTIC BAG, INJECTION (ML) INTRAVENOUS AS NEEDED
Status: DISCONTINUED | OUTPATIENT
Start: 2022-05-06 | End: 2022-05-07 | Stop reason: HOSPADM

## 2022-05-06 RX ORDER — MINERAL OIL
30 OIL (ML) ORAL AS NEEDED
Status: COMPLETED | OUTPATIENT
Start: 2022-05-06 | End: 2022-05-07

## 2022-05-06 RX ORDER — BUTORPHANOL TARTRATE 2 MG/ML
2 INJECTION INTRAMUSCULAR; INTRAVENOUS
Status: DISCONTINUED | OUTPATIENT
Start: 2022-05-06 | End: 2022-05-07 | Stop reason: HOSPADM

## 2022-05-06 RX ORDER — NALBUPHINE HYDROCHLORIDE 10 MG/ML
10 INJECTION, SOLUTION INTRAMUSCULAR; INTRAVENOUS; SUBCUTANEOUS
Status: DISCONTINUED | OUTPATIENT
Start: 2022-05-06 | End: 2022-05-07 | Stop reason: HOSPADM

## 2022-05-06 RX ORDER — HYDROMORPHONE HYDROCHLORIDE 1 MG/ML
1 INJECTION, SOLUTION INTRAMUSCULAR; INTRAVENOUS; SUBCUTANEOUS
Status: DISCONTINUED | OUTPATIENT
Start: 2022-05-06 | End: 2022-05-07 | Stop reason: HOSPADM

## 2022-05-06 RX ORDER — DIPHENHYDRAMINE HCL 25 MG
50 CAPSULE ORAL
Status: DISCONTINUED | OUTPATIENT
Start: 2022-05-06 | End: 2022-05-09 | Stop reason: HOSPADM

## 2022-05-06 RX ORDER — LIDOCAINE HYDROCHLORIDE 10 MG/ML
20 INJECTION, SOLUTION EPIDURAL; INFILTRATION; INTRACAUDAL; PERINEURAL AS NEEDED
Status: DISCONTINUED | OUTPATIENT
Start: 2022-05-06 | End: 2022-05-07 | Stop reason: HOSPADM

## 2022-05-06 RX ORDER — OXYTOCIN/0.9 % SODIUM CHLORIDE 30/500 ML
87.3 PLASTIC BAG, INJECTION (ML) INTRAVENOUS AS NEEDED
Status: COMPLETED | OUTPATIENT
Start: 2022-05-06 | End: 2022-05-07

## 2022-05-06 RX ORDER — OXYTOCIN/0.9 % SODIUM CHLORIDE 30/500 ML
0-20 PLASTIC BAG, INJECTION (ML) INTRAVENOUS
Status: DISCONTINUED | OUTPATIENT
Start: 2022-05-07 | End: 2022-05-09 | Stop reason: HOSPADM

## 2022-05-06 RX ORDER — SODIUM CHLORIDE, SODIUM LACTATE, POTASSIUM CHLORIDE, CALCIUM CHLORIDE 600; 310; 30; 20 MG/100ML; MG/100ML; MG/100ML; MG/100ML
125 INJECTION, SOLUTION INTRAVENOUS CONTINUOUS
Status: DISCONTINUED | OUTPATIENT
Start: 2022-05-06 | End: 2022-05-07 | Stop reason: HOSPADM

## 2022-05-06 RX ADMIN — BUTORPHANOL TARTRATE 2 MG: 2 INJECTION, SOLUTION INTRAMUSCULAR; INTRAVENOUS at 18:20

## 2022-05-06 RX ADMIN — DIPHENHYDRAMINE HYDROCHLORIDE 50 MG: 25 CAPSULE ORAL at 02:08

## 2022-05-06 RX ADMIN — BUTORPHANOL TARTRATE 2 MG: 2 INJECTION, SOLUTION INTRAMUSCULAR; INTRAVENOUS at 14:51

## 2022-05-06 RX ADMIN — DIPHENHYDRAMINE HYDROCHLORIDE 50 MG: 25 CAPSULE ORAL at 20:17

## 2022-05-06 RX ADMIN — BUTORPHANOL TARTRATE 2 MG: 2 INJECTION, SOLUTION INTRAMUSCULAR; INTRAVENOUS at 09:22

## 2022-05-06 NOTE — PROGRESS NOTES
37w5d arrived to unit c/o itching for the last two weeks. Pt states she was seen in the office on  for itching. The tested her for cholystais  She is still awaiting results. Benadryl was advised for pt to take for itching, pt states she has not tried the benadryl yet    0150:  Spoke with  SHOSHANA Tejeda CNM  Order to give Benadryl 50mg PO    0155:  Plan of care discussed with pt. Pt discussed understanding     0340:   Spoke with Lavon Quispe re strip. Ok to give IV 1 l fluid f LR.   Spoke with pt not ready for IV   Will juice first      0515:  IV placed

## 2022-05-06 NOTE — H&P
History & Physical    Name: Jovan Centeno MRN: [id number]  SSN: [id number]    YOB: 2000  Age: 23 y.o. Sex: female        Subjective:     Estimated Date of Delivery: 22  OB History    Para Term  AB Living   1             SAB IAB Ectopic Molar Multiple Live Births                    # Outcome Date GA Lbr Alex/2nd Weight Sex Delivery Anes PTL Lv   1 Current                Ms. Sarah Thompson is admitted with pregnancy at 37w5d for induction of labor. She initially presented to triage for pruritis, however non-reactive tracing noted, with occasional spontaneous/late decelerations. Prenatal course was normal. Please see prenatal records for details. Reports itching resolved with Benadryl. Normal fetal movement per pt. Past Medical History:   Diagnosis Date    Asthma     Chlamydia     Depression      Past Surgical History:   Procedure Laterality Date    HX OTHER SURGICAL  2008    sty removal      Social History     Occupational History    Not on file   Tobacco Use    Smoking status: Never Smoker    Smokeless tobacco: Never Used   Vaping Use    Vaping Use: Never used   Substance and Sexual Activity    Alcohol use: Not Currently     Comment: Occasional    Drug use: No    Sexual activity: Yes     Birth control/protection: None, Implant     Family History   Problem Relation Age of Onset    Anemia Mother         iron deficiency    Hypertension Father     Other Father         enlarged heart    Heart Attack Other     Diabetes Maternal Grandmother        No Known Allergies  Prior to Admission medications    Medication Sig Start Date End Date Taking? Authorizing Provider   B.infantis-B.ani-B.long-Linus (Probiotic 4X) 10-15 mg TbEC Take 3 Units by mouth daily. Provider, Historical   lidocaine 4 % patch Apply patch to back Apply patch to the affected area for 12 hours a day and remove for 12 hours a day.  1/15/22   Noah Jung MD   hydrocortisone (ANUSOL-HC) 25 mg supp Insert 1 Suppository into rectum every twelve (12) hours. Patient not taking: Reported on 1/14/2022 9/20/18   Sheyla Alas MD   cetirizine-psuedoePHEDrine (ZYRTEC-D) 5-120 mg per tablet Take 1 Tab by mouth two (2) times a day. Patient not taking: Reported on 1/14/2022 3/7/18   Sheyla Alas MD   VENTOLIN HFA 90 mcg/actuation inhaler INHALE 1 PUFF INHALE EVERY 4 HOURS AS NEEDED (PRN) 2/25/18   Provider, Historical   omega 3-dha-epa-fish oil (FISH OIL) 100-160-1,000 mg cap Take  by mouth. Patient not taking: Reported on 1/14/2022    Provider, Historical   MV-MIN/FOLIC/VIT B/CDETF/FMZ76 (DAILY MULTIVITAMIN PO) Take  by mouth. Provider, Historical   MAGNESIUM PO Take  by mouth. Patient not taking: Reported on 1/14/2022    Provider, Historical   inhalational spacing device Use every time you use your inhaler  Patient not taking: Reported on 1/14/2022 3/6/18   Sheyla Alas MD        Review of Systems: A comprehensive review of systems was negative except for that written in the HPI. Objective:     Vitals:  Vitals:    05/06/22 0135 05/06/22 0136 05/06/22 0200 05/06/22 0230   BP:  112/64 106/61 (!) 102/51   Pulse:  89 92 85   Resp:  16     Temp:  98.2 °F (36.8 °C)     Weight: 98.4 kg (217 lb)      Height: 5' 1\" (1.549 m)           Physical Exam:  Patient without distress. Abdomen: soft, nontender  Perineum: blood absent, amniotic fluid absent  Cervical Exam: 1/th/high  Lower Extremities: no c/c/e  Membranes:  Intact  Fetal Heart Rate: 145/min-moderate/+accels/+occ late/spontaneous decels    Prenatal Labs:   No results found for: ABORH, RUBELLAEXT, GRBSEXT, HBSAGEXT, HIVEXT, RPREXT, GONNOEXT, CHLAMEXT, ABORHEXT, RUBELLAEXT, GRBSEXT, HBSAGEXT, HIVEXT, RPREXT, GONNOEXT, CHLAMEXT      Assessment/Plan:     Active Problems:    37 weeks gestation of pregnancy (5/6/2022)    Decelerations at term     Plan: Admit for IOL. Group B Strep was negative. CRB placed at 0815. Plan removal in 12h. Signed By:  Rasta Irene MD     May 6, 2022

## 2022-05-06 NOTE — PROGRESS NOTES
Bedside and Verbal shift change report given to Michele Lee RN (oncoming nurse) by Tami Del Castillo RN (offgoing nurse). Report included the following information SBAR, Kardex, Intake/Output, MAR, Recent Results and Med Rec.

## 2022-05-06 NOTE — PROGRESS NOTES
5393 Bedside and Verbal shift change report given to Kely Dwyer, RN   (oncoming nurse) by LISA Carter RN (offgoing nurse). Report included the following information SBAR, Procedure Summary, Intake/Output, MAR, Recent Results and Med Rec Status. 18220 [facility] balloon placed by Dr. Moses Pineda, 80/80 cc in each balloon. Pt tolerated well. 1920 Bedside and Verbal shift change report given to ERROL SmithRN (oncoming nurse) by Kely Dwyer RN   (offgoing nurse). Report included the following information SBAR. no

## 2022-05-07 ENCOUNTER — ANESTHESIA EVENT (OUTPATIENT)
Dept: LABOR AND DELIVERY | Age: 22
End: 2022-05-07
Payer: COMMERCIAL

## 2022-05-07 ENCOUNTER — ANESTHESIA (OUTPATIENT)
Dept: LABOR AND DELIVERY | Age: 22
End: 2022-05-07
Payer: COMMERCIAL

## 2022-05-07 PROCEDURE — 74011250636 HC RX REV CODE- 250/636: Performed by: NURSE ANESTHETIST, CERTIFIED REGISTERED

## 2022-05-07 PROCEDURE — 75410000002 HC LABOR FEE PER 1 HR

## 2022-05-07 PROCEDURE — 77030032060 HC PWDR HEMSTAT ARISTA ASRB 3GM BARD -C: Performed by: OBSTETRICS & GYNECOLOGY

## 2022-05-07 PROCEDURE — 74011250637 HC RX REV CODE- 250/637

## 2022-05-07 PROCEDURE — 76060000032 HC ANESTHESIA 0.5 TO 1 HR: Performed by: OBSTETRICS & GYNECOLOGY

## 2022-05-07 PROCEDURE — 74011000258 HC RX REV CODE- 258: Performed by: STUDENT IN AN ORGANIZED HEALTH CARE EDUCATION/TRAINING PROGRAM

## 2022-05-07 PROCEDURE — 74011000250 HC RX REV CODE- 250: Performed by: NURSE ANESTHETIST, CERTIFIED REGISTERED

## 2022-05-07 PROCEDURE — 77030008459 HC STPLR SKN COOP -B: Performed by: OBSTETRICS & GYNECOLOGY

## 2022-05-07 PROCEDURE — 2709999900 HC NON-CHARGEABLE SUPPLY: Performed by: OBSTETRICS & GYNECOLOGY

## 2022-05-07 PROCEDURE — 77030007879 HC KT SPN EPDRL TELE -B: Performed by: NURSE ANESTHETIST, CERTIFIED REGISTERED

## 2022-05-07 PROCEDURE — 74011250637 HC RX REV CODE- 250/637: Performed by: ADVANCED PRACTICE MIDWIFE

## 2022-05-07 PROCEDURE — 65270000029 HC RM PRIVATE

## 2022-05-07 PROCEDURE — 76010000391 HC C SECN FIRST 1 HR: Performed by: OBSTETRICS & GYNECOLOGY

## 2022-05-07 PROCEDURE — 74011250636 HC RX REV CODE- 250/636: Performed by: STUDENT IN AN ORGANIZED HEALTH CARE EDUCATION/TRAINING PROGRAM

## 2022-05-07 PROCEDURE — 74011250636 HC RX REV CODE- 250/636: Performed by: ADVANCED PRACTICE MIDWIFE

## 2022-05-07 PROCEDURE — 74011250636 HC RX REV CODE- 250/636

## 2022-05-07 PROCEDURE — 10907ZC DRAINAGE OF AMNIOTIC FLUID, THERAPEUTIC FROM PRODUCTS OF CONCEPTION, VIA NATURAL OR ARTIFICIAL OPENING: ICD-10-PCS | Performed by: ADVANCED PRACTICE MIDWIFE

## 2022-05-07 PROCEDURE — 77030040830 HC CATH URETH FOL MDII -A

## 2022-05-07 PROCEDURE — 74011000258 HC RX REV CODE- 258

## 2022-05-07 PROCEDURE — 77030040361 HC SLV COMPR DVT MDII -B: Performed by: OBSTETRICS & GYNECOLOGY

## 2022-05-07 PROCEDURE — 75410000003 HC RECOV DEL/VAG/CSECN EA 0.5 HR

## 2022-05-07 PROCEDURE — 88307 TISSUE EXAM BY PATHOLOGIST: CPT

## 2022-05-07 PROCEDURE — 76060000078 HC EPIDURAL ANESTHESIA

## 2022-05-07 PROCEDURE — 77030031139 HC SUT VCRL2 J&J -A: Performed by: OBSTETRICS & GYNECOLOGY

## 2022-05-07 PROCEDURE — 74011250637 HC RX REV CODE- 250/637: Performed by: STUDENT IN AN ORGANIZED HEALTH CARE EDUCATION/TRAINING PROGRAM

## 2022-05-07 PROCEDURE — 75410000003 HC RECOV DEL/VAG/CSECN EA 0.5 HR: Performed by: OBSTETRICS & GYNECOLOGY

## 2022-05-07 RX ORDER — FACIAL-BODY WIPES
10 EACH TOPICAL
Status: DISCONTINUED | OUTPATIENT
Start: 2022-05-07 | End: 2022-05-09 | Stop reason: HOSPADM

## 2022-05-07 RX ORDER — SODIUM CHLORIDE 0.9 % (FLUSH) 0.9 %
5-40 SYRINGE (ML) INJECTION AS NEEDED
Status: DISCONTINUED | OUTPATIENT
Start: 2022-05-07 | End: 2022-05-09 | Stop reason: HOSPADM

## 2022-05-07 RX ORDER — MORPHINE SULFATE 1 MG/ML
INJECTION, SOLUTION EPIDURAL; INTRATHECAL; INTRAVENOUS AS NEEDED
Status: DISCONTINUED | OUTPATIENT
Start: 2022-05-07 | End: 2022-05-07 | Stop reason: HOSPADM

## 2022-05-07 RX ORDER — NALBUPHINE HYDROCHLORIDE 10 MG/ML
5 INJECTION, SOLUTION INTRAMUSCULAR; INTRAVENOUS; SUBCUTANEOUS
Status: ACTIVE | OUTPATIENT
Start: 2022-05-07 | End: 2022-05-08

## 2022-05-07 RX ORDER — OXYCODONE HYDROCHLORIDE 5 MG/1
10 TABLET ORAL
Status: ACTIVE | OUTPATIENT
Start: 2022-05-07 | End: 2022-05-08

## 2022-05-07 RX ORDER — PROMETHAZINE HYDROCHLORIDE 25 MG/ML
25 INJECTION, SOLUTION INTRAMUSCULAR; INTRAVENOUS
Status: DISCONTINUED | OUTPATIENT
Start: 2022-05-07 | End: 2022-05-09 | Stop reason: HOSPADM

## 2022-05-07 RX ORDER — LIDOCAINE HYDROCHLORIDE AND EPINEPHRINE 20; 5 MG/ML; UG/ML
INJECTION, SOLUTION EPIDURAL; INFILTRATION; INTRACAUDAL; PERINEURAL AS NEEDED
Status: DISCONTINUED | OUTPATIENT
Start: 2022-05-07 | End: 2022-05-07 | Stop reason: HOSPADM

## 2022-05-07 RX ORDER — SODIUM CHLORIDE 0.9 % (FLUSH) 0.9 %
5-40 SYRINGE (ML) INJECTION AS NEEDED
Status: DISCONTINUED | OUTPATIENT
Start: 2022-05-07 | End: 2022-05-07 | Stop reason: HOSPADM

## 2022-05-07 RX ORDER — LIDOCAINE HYDROCHLORIDE AND EPINEPHRINE 15; 5 MG/ML; UG/ML
INJECTION, SOLUTION EPIDURAL AS NEEDED
Status: DISCONTINUED | OUTPATIENT
Start: 2022-05-07 | End: 2022-05-07 | Stop reason: HOSPADM

## 2022-05-07 RX ORDER — ACETAMINOPHEN 325 MG/1
650 TABLET ORAL
Status: DISCONTINUED | OUTPATIENT
Start: 2022-05-07 | End: 2022-05-09 | Stop reason: HOSPADM

## 2022-05-07 RX ORDER — PHENYLEPHRINE HCL IN 0.9% NACL 1 MG/10 ML
100 SYRINGE (ML) INTRAVENOUS
Status: DISCONTINUED | OUTPATIENT
Start: 2022-05-07 | End: 2022-05-09 | Stop reason: HOSPADM

## 2022-05-07 RX ORDER — NALOXONE HYDROCHLORIDE 0.4 MG/ML
0.2 INJECTION, SOLUTION INTRAMUSCULAR; INTRAVENOUS; SUBCUTANEOUS
Status: ACTIVE | OUTPATIENT
Start: 2022-05-07 | End: 2022-05-08

## 2022-05-07 RX ORDER — FENTANYL/ROPIVACAINE/NS/PF 2MCG/ML-.1
PLASTIC BAG, INJECTION (ML) EPIDURAL
Status: DISPENSED
Start: 2022-05-07 | End: 2022-05-08

## 2022-05-07 RX ORDER — OXYTOCIN/0.9 % SODIUM CHLORIDE 30/500 ML
87.3 PLASTIC BAG, INJECTION (ML) INTRAVENOUS AS NEEDED
Status: DISCONTINUED | OUTPATIENT
Start: 2022-05-07 | End: 2022-05-09 | Stop reason: HOSPADM

## 2022-05-07 RX ORDER — HYDROMORPHONE HYDROCHLORIDE 1 MG/ML
0.5 INJECTION, SOLUTION INTRAMUSCULAR; INTRAVENOUS; SUBCUTANEOUS
Status: DISPENSED | OUTPATIENT
Start: 2022-05-07 | End: 2022-05-08

## 2022-05-07 RX ORDER — EPHEDRINE SULFATE/0.9% NACL/PF 50 MG/5 ML
10 SYRINGE (ML) INTRAVENOUS AS NEEDED
Status: DISCONTINUED | OUTPATIENT
Start: 2022-05-07 | End: 2022-05-07 | Stop reason: HOSPADM

## 2022-05-07 RX ORDER — HYDROMORPHONE HYDROCHLORIDE 1 MG/ML
1 INJECTION, SOLUTION INTRAMUSCULAR; INTRAVENOUS; SUBCUTANEOUS
Status: DISPENSED | OUTPATIENT
Start: 2022-05-07 | End: 2022-05-08

## 2022-05-07 RX ORDER — HYDROCORTISONE 25 MG/G
CREAM TOPICAL AS NEEDED
Status: DISCONTINUED | OUTPATIENT
Start: 2022-05-07 | End: 2022-05-09 | Stop reason: HOSPADM

## 2022-05-07 RX ORDER — OXYCODONE HYDROCHLORIDE 5 MG/1
5 TABLET ORAL
Status: DISPENSED | OUTPATIENT
Start: 2022-05-07 | End: 2022-05-08

## 2022-05-07 RX ORDER — OXYCODONE AND ACETAMINOPHEN 5; 325 MG/1; MG/1
1-2 TABLET ORAL
Status: DISCONTINUED | OUTPATIENT
Start: 2022-05-07 | End: 2022-05-07 | Stop reason: ALTCHOICE

## 2022-05-07 RX ORDER — FENTANYL/ROPIVACAINE/NS/PF 2MCG/ML-.1
PLASTIC BAG, INJECTION (ML) EPIDURAL
Status: COMPLETED
Start: 2022-05-07 | End: 2022-05-07

## 2022-05-07 RX ORDER — PHENYLEPHRINE HCL IN 0.9% NACL 1 MG/10 ML
SYRINGE (ML) INTRAVENOUS AS NEEDED
Status: DISCONTINUED | OUTPATIENT
Start: 2022-05-07 | End: 2022-05-07 | Stop reason: HOSPADM

## 2022-05-07 RX ORDER — ONDANSETRON 2 MG/ML
4 INJECTION INTRAMUSCULAR; INTRAVENOUS
Status: DISCONTINUED | OUTPATIENT
Start: 2022-05-07 | End: 2022-05-09 | Stop reason: HOSPADM

## 2022-05-07 RX ORDER — OXYTOCIN/RINGER'S LACTATE 30/500 ML
10 PLASTIC BAG, INJECTION (ML) INTRAVENOUS AS NEEDED
Status: DISCONTINUED | OUTPATIENT
Start: 2022-05-07 | End: 2022-05-09 | Stop reason: HOSPADM

## 2022-05-07 RX ORDER — ROPIVACAINE IN 0.9% SOD CHL/PF 0.2 %
PLASTIC BAG, INJECTION (ML) EPIDURAL AS NEEDED
Status: DISCONTINUED | OUTPATIENT
Start: 2022-05-07 | End: 2022-05-07 | Stop reason: HOSPADM

## 2022-05-07 RX ORDER — AMPICILLIN 2 G/1
2 INJECTION, POWDER, FOR SOLUTION INTRAVENOUS EVERY 6 HOURS
Status: DISCONTINUED | OUTPATIENT
Start: 2022-05-07 | End: 2022-05-07

## 2022-05-07 RX ORDER — LIDOCAINE HYDROCHLORIDE 10 MG/ML
INJECTION INFILTRATION; PERINEURAL AS NEEDED
Status: DISCONTINUED | OUTPATIENT
Start: 2022-05-07 | End: 2022-05-07 | Stop reason: HOSPADM

## 2022-05-07 RX ORDER — IBUPROFEN 400 MG/1
800 TABLET ORAL
Status: DISCONTINUED | OUTPATIENT
Start: 2022-05-10 | End: 2022-05-09 | Stop reason: HOSPADM

## 2022-05-07 RX ORDER — FENTANYL/ROPIVACAINE/NS/PF 2MCG/ML-.1
1-15 PLASTIC BAG, INJECTION (ML) EPIDURAL
Status: DISCONTINUED | OUTPATIENT
Start: 2022-05-07 | End: 2022-05-07 | Stop reason: HOSPADM

## 2022-05-07 RX ORDER — KETOROLAC TROMETHAMINE 30 MG/ML
30 INJECTION, SOLUTION INTRAMUSCULAR; INTRAVENOUS
Status: CANCELLED | OUTPATIENT
Start: 2022-05-07 | End: 2022-05-12

## 2022-05-07 RX ORDER — PHENYLEPHRINE HCL IN 0.9% NACL 1 MG/10 ML
100 SYRINGE (ML) INTRAVENOUS AS NEEDED
Status: DISCONTINUED | OUTPATIENT
Start: 2022-05-07 | End: 2022-05-07 | Stop reason: HOSPADM

## 2022-05-07 RX ORDER — KETOROLAC TROMETHAMINE 30 MG/ML
15 INJECTION, SOLUTION INTRAMUSCULAR; INTRAVENOUS
Status: CANCELLED | OUTPATIENT
Start: 2022-05-07 | End: 2022-05-08

## 2022-05-07 RX ORDER — KETOROLAC TROMETHAMINE 30 MG/ML
30 INJECTION, SOLUTION INTRAMUSCULAR; INTRAVENOUS EVERY 6 HOURS
Status: DISCONTINUED | OUTPATIENT
Start: 2022-05-07 | End: 2022-05-09 | Stop reason: HOSPADM

## 2022-05-07 RX ORDER — ONDANSETRON 2 MG/ML
INJECTION INTRAMUSCULAR; INTRAVENOUS AS NEEDED
Status: DISCONTINUED | OUTPATIENT
Start: 2022-05-07 | End: 2022-05-07 | Stop reason: HOSPADM

## 2022-05-07 RX ORDER — SODIUM CHLORIDE 0.9 % (FLUSH) 0.9 %
5-40 SYRINGE (ML) INJECTION EVERY 8 HOURS
Status: DISCONTINUED | OUTPATIENT
Start: 2022-05-07 | End: 2022-05-09 | Stop reason: HOSPADM

## 2022-05-07 RX ORDER — SIMETHICONE 80 MG
80 TABLET,CHEWABLE ORAL
Status: DISCONTINUED | OUTPATIENT
Start: 2022-05-07 | End: 2022-05-09 | Stop reason: HOSPADM

## 2022-05-07 RX ORDER — SODIUM CHLORIDE 0.9 % (FLUSH) 0.9 %
5-40 SYRINGE (ML) INJECTION EVERY 8 HOURS
Status: DISCONTINUED | OUTPATIENT
Start: 2022-05-07 | End: 2022-05-07 | Stop reason: HOSPADM

## 2022-05-07 RX ORDER — SODIUM CHLORIDE, SODIUM LACTATE, POTASSIUM CHLORIDE, CALCIUM CHLORIDE 600; 310; 30; 20 MG/100ML; MG/100ML; MG/100ML; MG/100ML
125 INJECTION, SOLUTION INTRAVENOUS CONTINUOUS
Status: DISPENSED | OUTPATIENT
Start: 2022-05-07 | End: 2022-05-08

## 2022-05-07 RX ORDER — ACETAMINOPHEN 650 MG/1
650 SUPPOSITORY RECTAL
Status: DISCONTINUED | OUTPATIENT
Start: 2022-05-07 | End: 2022-05-09 | Stop reason: HOSPADM

## 2022-05-07 RX ORDER — LANOLIN ALCOHOL/MO/W.PET/CERES
3 CREAM (GRAM) TOPICAL
Status: DISCONTINUED | OUTPATIENT
Start: 2022-05-07 | End: 2022-05-09 | Stop reason: HOSPADM

## 2022-05-07 RX ORDER — DIPHENHYDRAMINE HYDROCHLORIDE 50 MG/ML
25 INJECTION, SOLUTION INTRAMUSCULAR; INTRAVENOUS
Status: DISCONTINUED | OUTPATIENT
Start: 2022-05-07 | End: 2022-05-09 | Stop reason: HOSPADM

## 2022-05-07 RX ORDER — NALOXONE HYDROCHLORIDE 0.4 MG/ML
0.4 INJECTION, SOLUTION INTRAMUSCULAR; INTRAVENOUS; SUBCUTANEOUS
Status: ACTIVE | OUTPATIENT
Start: 2022-05-07 | End: 2022-05-08

## 2022-05-07 RX ORDER — ACETAMINOPHEN 500 MG
1000 TABLET ORAL
Status: COMPLETED | OUTPATIENT
Start: 2022-05-07 | End: 2022-05-07

## 2022-05-07 RX ADMIN — Medication 2 MILLI-UNITS/MIN: at 01:09

## 2022-05-07 RX ADMIN — Medication 5 ML: at 01:31

## 2022-05-07 RX ADMIN — LIDOCAINE HYDROCHLORIDE 3 ML: 10 INJECTION, SOLUTION INFILTRATION; PERINEURAL at 01:16

## 2022-05-07 RX ADMIN — ROPIVACAINE HYDROCHLORIDE 12 ML/HR: 5 INJECTION EPIDURAL; INFILTRATION; PERINEURAL at 07:10

## 2022-05-07 RX ADMIN — ROPIVACAINE HYDROCHLORIDE 12 ML/HR: 5 INJECTION EPIDURAL; INFILTRATION; PERINEURAL at 01:37

## 2022-05-07 RX ADMIN — Medication 100 MCG: at 17:27

## 2022-05-07 RX ADMIN — AMPICILLIN SODIUM 2 G: 2 INJECTION, POWDER, FOR SOLUTION INTRAMUSCULAR; INTRAVENOUS at 14:35

## 2022-05-07 RX ADMIN — HYDROMORPHONE HYDROCHLORIDE 1 MG: 1 INJECTION, SOLUTION INTRAMUSCULAR; INTRAVENOUS; SUBCUTANEOUS at 18:58

## 2022-05-07 RX ADMIN — Medication 5 ML: at 10:25

## 2022-05-07 RX ADMIN — LIDOCAINE HYDROCHLORIDE,EPINEPHRINE BITARTRATE 5 ML: 20; .005 INJECTION, SOLUTION EPIDURAL; INFILTRATION; INTRACAUDAL; PERINEURAL at 16:49

## 2022-05-07 RX ADMIN — LIDOCAINE HYDROCHLORIDE,EPINEPHRINE BITARTRATE 2 ML: 20; .005 INJECTION, SOLUTION EPIDURAL; INFILTRATION; INTRACAUDAL; PERINEURAL at 16:52

## 2022-05-07 RX ADMIN — LIDOCAINE HYDROCHLORIDE,EPINEPHRINE BITARTRATE 5 ML: 20; .005 INJECTION, SOLUTION EPIDURAL; INFILTRATION; INTRACAUDAL; PERINEURAL at 16:41

## 2022-05-07 RX ADMIN — HYDROMORPHONE HYDROCHLORIDE 1 MG: 1 INJECTION, SOLUTION INTRAMUSCULAR; INTRAVENOUS; SUBCUTANEOUS at 20:28

## 2022-05-07 RX ADMIN — Medication 100 MCG: at 05:35

## 2022-05-07 RX ADMIN — LIDOCAINE HYDROCHLORIDE,EPINEPHRINE BITARTRATE 5 ML: 20; .005 INJECTION, SOLUTION EPIDURAL; INFILTRATION; INTRACAUDAL; PERINEURAL at 16:45

## 2022-05-07 RX ADMIN — SODIUM CHLORIDE, POTASSIUM CHLORIDE, SODIUM LACTATE AND CALCIUM CHLORIDE 125 ML/HR: 600; 310; 30; 20 INJECTION, SOLUTION INTRAVENOUS at 02:00

## 2022-05-07 RX ADMIN — Medication 8 MILLI-UNITS/MIN: at 03:03

## 2022-05-07 RX ADMIN — Medication 14 MILLI-UNITS/MIN: at 04:49

## 2022-05-07 RX ADMIN — LIDOCAINE HYDROCHLORIDE AND EPINEPHRINE 3 ML: 15; 5 INJECTION, SOLUTION EPIDURAL at 01:25

## 2022-05-07 RX ADMIN — ACETAMINOPHEN 1000 MG: 500 TABLET ORAL at 14:40

## 2022-05-07 RX ADMIN — KETOROLAC TROMETHAMINE 30 MG: 30 INJECTION, SOLUTION INTRAMUSCULAR at 18:28

## 2022-05-07 RX ADMIN — Medication 667 ML/HR: at 16:57

## 2022-05-07 RX ADMIN — GENTAMICIN SULFATE 136 MG: 40 INJECTION, SOLUTION INTRAMUSCULAR; INTRAVENOUS at 15:18

## 2022-05-07 RX ADMIN — MINERAL OIL 30 ML: 1000 SOLUTION ORAL at 13:49

## 2022-05-07 RX ADMIN — Medication 100 MCG: at 17:58

## 2022-05-07 RX ADMIN — SODIUM CHLORIDE, POTASSIUM CHLORIDE, SODIUM LACTATE AND CALCIUM CHLORIDE 125 ML/HR: 600; 310; 30; 20 INJECTION, SOLUTION INTRAVENOUS at 19:48

## 2022-05-07 RX ADMIN — MORPHINE SULFATE 3 MG: 1 INJECTION, SOLUTION EPIDURAL; INTRATHECAL; INTRAVENOUS at 17:00

## 2022-05-07 RX ADMIN — Medication 100 MCG: at 05:49

## 2022-05-07 RX ADMIN — Medication 100 MCG: at 17:07

## 2022-05-07 RX ADMIN — ONDANSETRON 4 MG: 2 INJECTION INTRAMUSCULAR; INTRAVENOUS at 11:42

## 2022-05-07 RX ADMIN — AMPICILLIN SODIUM 2 G: 2 INJECTION, POWDER, FOR SOLUTION INTRAMUSCULAR; INTRAVENOUS at 20:33

## 2022-05-07 RX ADMIN — DIPHENHYDRAMINE HYDROCHLORIDE 25 MG: 50 INJECTION, SOLUTION INTRAMUSCULAR; INTRAVENOUS at 19:46

## 2022-05-07 RX ADMIN — ROPIVACAINE HYDROCHLORIDE 12 ML/HR: 5 INJECTION EPIDURAL; INFILTRATION; PERINEURAL at 12:34

## 2022-05-07 RX ADMIN — DIPHENHYDRAMINE HYDROCHLORIDE 50 MG: 25 CAPSULE ORAL at 06:54

## 2022-05-07 RX ADMIN — SODIUM CHLORIDE, POTASSIUM CHLORIDE, SODIUM LACTATE AND CALCIUM CHLORIDE 500 ML: 600; 310; 30; 20 INJECTION, SOLUTION INTRAVENOUS at 00:45

## 2022-05-07 RX ADMIN — ONDANSETRON HYDROCHLORIDE 4 MG: 2 INJECTION INTRAMUSCULAR; INTRAVENOUS at 16:53

## 2022-05-07 NOTE — PROGRESS NOTES
Bedside and Verbal shift change report given to Violeta Rowan RN (oncoming nurse) by Ema Hopkins RN (offgoing nurse). Report included the following information SBAR, Kardex, ED Summary, OR Summary, Procedure Summary, Intake/Output, MAR, Accordion, Recent Results and Med Rec Status. 2000  C/O of itching to entire body, medicated with  Benadryl as ordered     2052 taken off monitor to allow for hygenic needs    2055 [facility] balloon removed by CNCAT VE reported to be  4.5/80/-2    2221 Patient attached to monitor and IVF recommenced. Nil complaint presently    0404 Position changed to lt lateral, toco and ultrasound adjusted, Ucath placed. Complain of increased pressure in vagina VE done 4.5/60/-3    0432  YASH Campbell in room, AROM done, clear fluid obtained. Hygenic needs met and patient repositioned    0442 BP low  Bolus  of 500 ml LR in progress, patient placed in trendelenburg position, tilted to the left. [address] called updated re patients blood pressures, orders made for Henrik- synephrine, 2 doses administered. 0554 epidural paused and YASH James called to bedside as BP remains low. [address] in room, administered additional medication to patient. 7105 patient position changed. Blood pressures now improving.

## 2022-05-07 NOTE — PROGRESS NOTES
Temp 102.0 axillary; pt pushing since 1300;  Tylenol 650 mg WY, Amp 2 gm q6 hr x 24 hours and Gentamyacin 2 mg/kg q 12 hours ordered x 24 hours IVPB.       Rojelio Miranda

## 2022-05-07 NOTE — INTERVAL H&P NOTE
Update History & Physical    The Patient's History and Physical was reviewed with the patient and I examined the patient. There was a change in delivery plan due to failure to descend, fever and fetal tachcardia. The surgical site was confirmed by the patient and me. Plan:  The risk, benefits, expected outcome, and alternative to the recommended procedure have been discussed with the patient. Patient understands and wants to proceed with the procedure.     Electronically signed by Lou De Dios MD on 5/7/2022 at 4:44 PM

## 2022-05-07 NOTE — PROGRESS NOTES
Pt has been pushing for 3 hours with minimal progress. Baby still at 0 station with lots of caput. Has been pushing adequately in multiple positions including hands and knees, right/left side lying, semi fowlers, birthing bar and tug of war without adequate progress. Fever is now 100.3 after tylenol and antibiotics. Called Dr. Boone Andre to advise of need for CS. Discussed POC with pt and pt/sig other agree with POC for PLTCS. Anesthesia and team called to proceed with CS. Education done.      Elliot Miranda

## 2022-05-07 NOTE — ANESTHESIA PREPROCEDURE EVALUATION
Relevant Problems   No relevant active problems       Anesthetic History               Review of Systems / Medical History  Patient summary reviewed, nursing notes reviewed and pertinent labs reviewed    Pulmonary            Asthma        Neuro/Psych         Psychiatric history     Cardiovascular                       GI/Hepatic/Renal                Endo/Other        Obesity     Other Findings                   Anesthetic Plan    ASA: 2  Anesthesia type: epidural      Post-op pain plan if not by surgeon: epidural opioid and indwelling epidural catheter      Anesthetic plan and risks discussed with: Patient

## 2022-05-07 NOTE — PROGRESS NOTES
Labor Progress Note    Patient seen, fetal heart rate and contraction pattern evaluated. Physical Exam:  Pelvic: Cervix 7,   Effaced: 80%  Station:  -1     Contractions: Every 2-3 minutes, severe  Fetal Heart Rate: Baseline: 150 per minute  Variability: moderate  Accelerations: yes  Decelerations: variable, early  Cat II strip     Assessment:  Active phase labor. and Satisfactory labor progress. PLAN:  Reassuring fetal status, Labor  Progressing normally  Continue expectant management  Not progressing normally  continue pitocin augmentation  titrating dosage safely, Continue plan for vaginal delivery continue position changes.      [address] , YASH  5/7/2022  9:25 AM

## 2022-05-07 NOTE — PROGRESS NOTES
1500 Bedside and Verbal shift change report given to Meryl Mullins RN (oncoming nurse) by Clifton Cha RN (offgoing nurse). Report included the following information SBAR, Kardex, Intake/Output and MAR.     1509 Patient pushing with RN at bedside continuously evaluating  [address]. 1515 no urine return. [address]. YASH Jason at bedside. 1520 Patient pushing with RN at bedside continuously evaluating FHT. 18 assisted to hands knees    1535  Patient pushing with RN at bedside continuously evaluating FHT. 1542 break from pushing, patient in knee chest    1547 patient resumes pushing with RN at bedside continuously monitoring FHT. 1550 Patient pushing with RN and CNM at bedside continuously evaluating  [address]. Us adjusted as needed    1554 FSE placed by YASH. Patient remains in knee chest    1558 right lateral    1601 SVE by RASHEL Jason CNM. No progress in descent. 1604 c-section called. LR bolusing    1729 CNRA at bedside. Orders received. 1835 Patient states she is \"cramping too much\" to tolerate fundal exam. Risks discussed, s/sx to report discussed. [facility] updated on patient pain management. Patient has declined several fundal exams due to pain. Patient aware of increased risk of hemorrhage related to post-op   & chorio. Blake Stinson CNM will discuss pain management options with pharmacy.  TRANSFER - OUT REPORT:    Verbal report given to Marylen Amend, RN(name) on [facility]  being transferred to [employer] (unit) for routine progression of care       Report consisted of patients Situation, Background, Assessment and   Recommendations(SBAR). Information from the following report(s) SBAR, Kardex, Intake/Output and MAR was reviewed with the receiving nurse.     Lines:   Peripheral IV 22 Left Antecubital (Active)   Site Assessment Clean, dry, & intact 22 174   Phlebitis Assessment 0 22 174   Infiltration Assessment 0 22 174   Dressing Status Clean, dry, & intact 05/07/22 1740   Dressing Type Tape;Transparent 05/07/22 1740   Hub Color/Line Status Pink 05/07/22 1740   Action Taken Open ports on tubing capped 05/06/22 6779        Opportunity for questions and clarification was provided.       Patient transported with:   Registered Nurse

## 2022-05-07 NOTE — PROGRESS NOTES
Problem: Patient Education: Go to Patient Education Activity  Goal: Patient/Family Education  Outcome: Progressing Towards Goal     Problem: Vaginal Delivery: Day of Deliver-Laboring  Goal: Activity/Safety  Outcome: Progressing Towards Goal  Goal: Consults, if ordered  Outcome: Progressing Towards Goal  Goal: Diagnostic Test/Procedures  Outcome: Progressing Towards Goal  Goal: Discharge Planning  Outcome: Progressing Towards Goal  Goal: Medications  Outcome: Progressing Towards Goal  Goal: Respiratory  Outcome: Progressing Towards Goal  Goal: Treatments/Interventions/Procedures  Outcome: Progressing Towards Goal  Goal: *Vital signs within defined limits  Outcome: Progressing Towards Goal  Goal: *Labs within defined limits  Outcome: Progressing Towards Goal  Goal: *Hemodynamically stable  Outcome: Progressing Towards Goal  Goal: *Optimal pain control at patient's stated goal  Outcome: Progressing Towards Goal     Problem: Vaginal Delivery: Day of Delivery-Post delivery  Goal: Activity/Safety  Outcome: Progressing Towards Goal  Goal: Consults, if ordered  Outcome: Progressing Towards Goal  Goal: Nutrition/Diet  Outcome: Progressing Towards Goal  Goal: Discharge Planning  Outcome: Progressing Towards Goal  Goal: Medications  Outcome: Progressing Towards Goal  Goal: Treatments/Interventions/Procedures  Outcome: Progressing Towards Goal  Goal: *Vital signs within defined limits  Outcome: Progressing Towards Goal  Goal: *Labs within defined limits  Outcome: Progressing Towards Goal  Goal: *Hemodynamically stable  Outcome: Progressing Towards Goal  Goal: *Optimal pain control at patient's stated goal  Outcome: Progressing Towards Goal  Goal: *Participates in infant care  Outcome: Progressing Towards Goal  Goal: *Demonstrates progressive activity  Outcome: Progressing Towards Goal  Goal: *Tolerating diet  Outcome: Progressing Towards Goal     Problem: Falls - Risk of  Goal: *Absence of Falls  Description: Document Adam Reagan Fall Risk and appropriate interventions in the flowsheet.   Outcome: Progressing Towards Goal  Note: Fall Risk Interventions:            Medication Interventions: Patient to call before getting OOB                   Problem: Patient Education: Go to Patient Education Activity  Goal: Patient/Family Education  Outcome: Progressing Towards Goal

## 2022-05-07 NOTE — PROGRESS NOTES
Labor Progress Note  Patient seen, fetal heart rate and contraction pattern evaluated, patient examined. No data found. Physical Exam:  Cervical Exam:  4 cm dilated    80% effaced    -2 station    Presenting Part: cephalic  Cervical Position: mid position  Membranes:  Intact  Uterine Activity: Frequency: Every 4-5 minutes, Duration: 70 seconds and Intensity: moderate  Fetal Heart Rate: Baseline: 130 per minute  Variability: moderate  Accelerations: yes  Decelerations: none  Uterine contractions: regular, every 4-5 minutes    Assessment/Plan:  Cook catheter removed. Plan for patient to shower, eat, and start pitocin per protocol.  AVIVA upon request

## 2022-05-07 NOTE — ANESTHESIA POSTPROCEDURE EVALUATION
Post-Anesthesia Evaluation & Assessment    Visit Vitals  BP (!) 104/39   Pulse (!) 110   Temp 37.9 °C (100.3 °F)   Resp 16   Ht 5' 1\" (1.549 m)   Wt 98.4 kg (217 lb)   SpO2 98%   BMI 41.00 kg/m²       No untreated/active PONV    Post-operative hydration adequate. Adequate post-operative analgesia per PACU discharge criteria    Mental status & level of consciousness: alert and oriented x 3    Respiratory status: patent unassisted airway     No apparent anesthetic complications requiring additional post-anesthetic care    Patient has met all discharge requirements.             Leigh Cortes CRNA

## 2022-05-07 NOTE — ROUTINE PROCESS
Bedside and Verbal shift change report given to [facility] Uriel  (oncoming nurse) by Jodi Mccormack RN (offgoing nurse). Report included the following information SBAR, Kardex, ED Summary, OR Summary, Procedure Summary, Intake/Output, MAR, Accordion, Recent Results and Med Rec Status.

## 2022-05-07 NOTE — ANESTHESIA PROCEDURE NOTES
Epidural Block    Patient location during procedure: OB  Start time: 5/7/2022 1:16 AM  End time: 5/7/2022 1:25 AM  Reason for block: labor epidural  Staffing  Performed: CRNA   Resident/CRNA: Rubens Eller CRNA  Preanesthetic Checklist  Completed: patient identified, IV checked, site marked, risks and benefits discussed, surgical consent, monitors and equipment checked, pre-op evaluation and timeout performed  Block Placement  Patient position: sitting  Prep: ChloraPrep  Sterility prep: cap, drape, gloves and mask  Sedation level: no sedation  Patient monitoring: continuous pulse oximetry, frequent blood pressure checks and heart rate  Approach: midline  Location: lumbar  Lumbar location: L3-L4  Epidural  Loss of resistance technique: saline  Guidance: landmark technique  Needle  Needle type: Tuohy   Needle gauge: 17 G  Needle length: 9 cm  Needle insertion depth: 6.5 cm  Catheter type: end hole  Catheter size: 19 G  Catheter at skin depth: 11.5 cm  Catheter securement method: clear occlusive dressing and surgical tape  Test dose: negative  Assessment  Block outcome: pain improved  Number of attempts: 1  Procedure assessment: patient tolerated procedure well with no immediate complications

## 2022-05-07 NOTE — PROGRESS NOTES
0715 - Bedside and Verbal shift change report given to LISA Madden RN (oncoming nurse) by ERROL Smith RN (offgoing nurse). Report included the following information SBAR, Kardex, Intake/Output, MAR and Recent Results. 0720 - Assessment complete. POC discussed. 3330 - repositioned left lateral with peanut ball. 3516 - SVE 6/80/-1, repositioned sitting up thigh with peanut ball under right leg  0830 - pitocin decreased to 14mu/ml - tachy systole  0841 - S. Wilbert CNM present on unit - update give on patient SVE and strip   0910 - repositioned right lateral with leg in stir up  0920 - CNM at bedside, SVE 7/80/-1, placed back to right lateral with leg in stir up  1010 - repositioned left lateral with peanut ball. Request pain medication  1016 - CRNA paged  1050 - CNM at bedside; SVE complete/100/0  1055 - positioned sitting up  1256 - CNM at bedside  1257 - salazar removed  1302 - pushing  1325 - left lateral, pushing  1333- right lateral pushing  1342 - supine, pushing with bar  1505 - Bedside and Verbal shift change report given to Pebbles ALBA (oncoming nurse) by Hayder Abbasi RN (offgoing nurse). Report included the following information SBAR, Kardex, Intake/Output, MAR and Recent Results.

## 2022-05-07 NOTE — PROGRESS NOTES
Labor Progress Note  Patient seen, fetal heart rate and contraction pattern evaluated, patient examined. No data found. Physical Exam:  Cervical Exam:  4 cm dilated    80% effaced    -2 station    Presenting Part: cephalic  Cervical Position: mid position  Membranes:  Artificial Rupture of Membranes;  Amniotic Fluid: large amount of clear fluid  Uterine Activity: Frequency: Every 4-5 minutes, Duration: 70 seconds and Intensity: moderate  Fetal Heart Rate: Baseline: 140 per minute  Variability: moderate  Accelerations: yes  Decelerations: none    Assessment/Plan:  Reassuring fetal status, Labor  Progressing normally, Continue plan for vaginal delivery

## 2022-05-07 NOTE — OP NOTES
Postoperative Note    Patient: Fidelina Holly  YOB: 2000  MRN: [id number]    Date of Procedure: 2022     Pre-Op Diagnosis: IUP 37 6/7 weeks, Failure to Descend, Fetal tachycardia, Failed induction, Maternal fever    Post-Op Diagnosis: Same as preoperative diagnosis. Procedure(s):  PRIMARY LOW TRANSVERSE  SECTION    Surgeon(s):  Brie Blackwood MD    Surgical Assistant: None    Anesthesia: Epidural    Estimated Blood Loss (mL): 868 CC    Complications: None    Specimens:   ID Type Source Tests Collected by Time Destination   1 : placenta with cord segment Fresh Placenta  Brie Blackwood MD 2022 1707 Pathology        Implants: * No implants in log *    Drains: * No LDAs found *    Findings: viable female infant, Occiput Posterior, Apgar's assigned by peds, grossly normal uterus, tubes and ovaries    Procedure:   Patient was taken to the OR after informed consent had been obtained. Spinal epidural was then placed. She was then placed in a dorsal supine position with a left lateral tilt. She was prepped and draped in a sterile fashion. Time out was completed. Attention was turned to the abdomen and an Allis test confirmed adequate anesthesia. A Pfannenstiel skin incision was made 3 cm above the symphysis pubis. The incision was carried down to the fascia. The fascia was opened in the midline. The subcutaneous tissue and fascia were extended bilaterally. The rectus muscle was divided bluntly. The peritoneum was entered. The bladder blade was inserted. The uterus was scored in the lower uterine segment and then entered in the midline. The uterine incision was extended bilaterally, transversly. The fetal head was flexed, pressure was applied to the abdomen and the fetal head was delivered followed by the body. The cord was clamped and cut. The placenta was manually extracted. The uterus was cleared of all clot and debris.  The incision was closed with 0 Vicryl in a running fashion. A second imbricating layer of 0 Vicryl was placed. Two figures-of-eight of 0 Vicryl were placed in the midline of the uterine incision. Eva was applied for hemostasis. Excellent hemostasis was noted. The fascia was closed in a running fashion. The skin was closed with Ensorb dissolvable sutures. The counts were correct. The mother and child tolerated the procedure well.      Sloane Field DO            Electronically Signed by Kirsten Huff MD on 5/7/2022 at 5:16 PM

## 2022-05-07 NOTE — ROUTINE PROCESS
Antwan  in room for epidural. Patient educated and assisted into position.     0116 time out done    0125 test dose given    0131 bolus    0136 end procedure

## 2022-05-08 LAB
HCT VFR BLD AUTO: 28.9 % (ref 35–45)
HGB BLD-MCNC: 9.5 G/DL (ref 12–16)

## 2022-05-08 PROCEDURE — 74011250636 HC RX REV CODE- 250/636

## 2022-05-08 PROCEDURE — 85018 HEMOGLOBIN: CPT

## 2022-05-08 PROCEDURE — 74011250637 HC RX REV CODE- 250/637: Performed by: ADVANCED PRACTICE MIDWIFE

## 2022-05-08 PROCEDURE — 74011250637 HC RX REV CODE- 250/637: Performed by: MIDWIFE

## 2022-05-08 PROCEDURE — 74011250637 HC RX REV CODE- 250/637

## 2022-05-08 PROCEDURE — 65270000029 HC RM PRIVATE

## 2022-05-08 PROCEDURE — 36415 COLL VENOUS BLD VENIPUNCTURE: CPT

## 2022-05-08 PROCEDURE — 74011000258 HC RX REV CODE- 258

## 2022-05-08 PROCEDURE — 74011250637 HC RX REV CODE- 250/637: Performed by: STUDENT IN AN ORGANIZED HEALTH CARE EDUCATION/TRAINING PROGRAM

## 2022-05-08 RX ORDER — OXYCODONE AND ACETAMINOPHEN 5; 325 MG/1; MG/1
2 TABLET ORAL
Status: DISCONTINUED | OUTPATIENT
Start: 2022-05-08 | End: 2022-05-09 | Stop reason: HOSPADM

## 2022-05-08 RX ORDER — OXYCODONE AND ACETAMINOPHEN 5; 325 MG/1; MG/1
1 TABLET ORAL
Status: DISCONTINUED | OUTPATIENT
Start: 2022-05-08 | End: 2022-05-09 | Stop reason: HOSPADM

## 2022-05-08 RX ADMIN — OXYCODONE HYDROCHLORIDE 5 MG: 5 TABLET ORAL at 09:07

## 2022-05-08 RX ADMIN — KETOROLAC TROMETHAMINE 30 MG: 30 INJECTION, SOLUTION INTRAMUSCULAR at 06:32

## 2022-05-08 RX ADMIN — DIPHENHYDRAMINE HYDROCHLORIDE 50 MG: 25 CAPSULE ORAL at 12:22

## 2022-05-08 RX ADMIN — GENTAMICIN SULFATE 136 MG: 40 INJECTION, SOLUTION INTRAMUSCULAR; INTRAVENOUS at 03:21

## 2022-05-08 RX ADMIN — Medication 80 MG: at 06:32

## 2022-05-08 RX ADMIN — KETOROLAC TROMETHAMINE 30 MG: 30 INJECTION, SOLUTION INTRAMUSCULAR at 17:56

## 2022-05-08 RX ADMIN — AMPICILLIN SODIUM 2 G: 2 INJECTION, POWDER, FOR SOLUTION INTRAMUSCULAR; INTRAVENOUS at 09:07

## 2022-05-08 RX ADMIN — OXYCODONE AND ACETAMINOPHEN 2 TABLET: 5; 325 TABLET ORAL at 20:58

## 2022-05-08 RX ADMIN — KETOROLAC TROMETHAMINE 30 MG: 30 INJECTION, SOLUTION INTRAMUSCULAR at 11:45

## 2022-05-08 RX ADMIN — Medication 80 MG: at 21:41

## 2022-05-08 RX ADMIN — KETOROLAC TROMETHAMINE 30 MG: 30 INJECTION, SOLUTION INTRAMUSCULAR at 00:38

## 2022-05-08 RX ADMIN — AMPICILLIN SODIUM 2 G: 2 INJECTION, POWDER, FOR SOLUTION INTRAMUSCULAR; INTRAVENOUS at 02:39

## 2022-05-08 RX ADMIN — Medication 80 MG: at 16:32

## 2022-05-08 NOTE — PROGRESS NOTES
Problem:  Delivery: Postpartum Day 1  Goal: Activity/Safety  Outcome: Progressing Towards Goal  Goal: Nutrition/Diet  Outcome: Progressing Towards Goal  Goal: Discharge Planning  Outcome: Progressing Towards Goal  Goal: Medications  Outcome: Progressing Towards Goal  Goal: Respiratory  Outcome: Progressing Towards Goal  Goal: Psychosocial  Outcome: Progressing Towards Goal  Goal: *Vital signs within defined limits  Outcome: Progressing Towards Goal  Goal: *Hemodynamically stable  Outcome: Progressing Towards Goal  Goal: *Optimal pain control at patient's stated goal  Outcome: Progressing Towards Goal  Goal: *Participates in infant care  Outcome: Progressing Towards Goal  Goal: *Demonstrates progressive activity  Outcome: Progressing Towards Goal  Goal: *Tolerating diet  Outcome: Progressing Towards Goal  Goal: *Performs self perineal care  Outcome: Progressing Towards Goal

## 2022-05-08 NOTE — PROGRESS NOTES
2051 TRANSFER - IN REPORT:    Verbal report received from CAT Brown rn(name) on [facility]  being received from L&D(unit) for routine post - op      Report consisted of patients Situation, Background, Assessment and   Recommendations(SBAR). Information from the following report(s) SBAR, Kardex, Intake/Output, MAR and Recent Results was reviewed with the receiving nurse. Opportunity for questions and clarification was provided. Assessment completed upon patients arrival to unit and care assumed. Oriented to room call bell in reach, Scd's applied bilaterally, IS given w/instructions on usage q2hrs usage well, notified to call for excessive bleeding or clot passage, family @ bedside, HOB up 30 degrees and pt aware., family @ bedside   2133 assist with breastfeeding at  this time  2300 BEDSIDE_VERBAL_RECORDED_WRITTEN: shift change report given to SHOSHANA Sandoval rn (oncoming nurse) by Naldo Fischer (offgoing nurse). Report given with SBAR, Kardex and MAR.

## 2022-05-08 NOTE — PROGRESS NOTES
0715- Bedside and Verbal shift change report given to Alejandra Hodgkin, RN (oncoming nurse) by Cristla Rose RN (offgoing nurse). Report included the following information SBAR, Intake/Output, MAR and Recent Results. 9716 Assessment done    0907 Pain med given; pt sitting up in bed; giuliano bkfst meal    0950 pt holding infant for feed ; reports pain improving    1145 Scheduled med given; pt states pain cont to improve; no other c/o     1223 Bendryl given for c/o generalized  Itching    1305 pt states itching improved    1320 Linens changed while pt up to BR; giuliano amb activity well    1545 pt sleeping; resp even, unlabored    1625 Assessment done; Mylicon given for c/o abd discomfort , thinking may be r/t gas pains. Enc pt to amb in room while getting up to BR    1800 pt states feels somewhat better. Amb in room awhile ago. Scheduled Toradol given. Sitting up on edge of bed; family in at bedside    1855 pt up to [facility] - plans to take shower. IV left arm covered w/ plastic. Family member in room w/ infant    1925 Verbal shift change report given to Miller Anna (oncoming nurse) by Louie Kendall (offgoing nurse). Report included the following information SBAR, Kardex, OR Summary, Intake/Output, MAR and Recent Results.

## 2022-05-08 NOTE — PROGRESS NOTES
2330- Bedside and Verbal shift change report given to Jessa Cat, ANJALI (oncoming nurse) by Nilo Ryan RN (offgoing nurse). Report included the following information SBAR, Intake/Output, MAR and Recent Results. 0715- Bedside and Verbal shift change report given to Yamilex Long RN (oncoming nurse) by Jessa Cat RN (offgoing nurse). Report included the following information SBAR, Intake/Output, MAR and Recent Results.

## 2022-05-08 NOTE — PROGRESS NOTES
ANESTHESIA    POD #1 CS  Epidural Narcotics  Pain rated  7/10 by patient. She was in restroom. Doing well. No complications. Full return neuromuscular function and no headache. Nurse heard her request for something for discomfort and will follow up.

## 2022-05-09 VITALS
DIASTOLIC BLOOD PRESSURE: 57 MMHG | HEIGHT: 61 IN | BODY MASS INDEX: 40.97 KG/M2 | OXYGEN SATURATION: 100 % | HEART RATE: 100 BPM | WEIGHT: 217 LBS | RESPIRATION RATE: 18 BRPM | SYSTOLIC BLOOD PRESSURE: 110 MMHG | TEMPERATURE: 98.4 F

## 2022-05-09 PROBLEM — Z3A.37 37 WEEKS GESTATION OF PREGNANCY: Status: RESOLVED | Noted: 2022-05-06 | Resolved: 2022-05-09

## 2022-05-09 PROBLEM — Z98.891 S/P CESAREAN SECTION: Status: ACTIVE | Noted: 2022-05-09

## 2022-05-09 PROCEDURE — 74011250636 HC RX REV CODE- 250/636

## 2022-05-09 PROCEDURE — 74011250637 HC RX REV CODE- 250/637: Performed by: MIDWIFE

## 2022-05-09 RX ORDER — OXYCODONE AND ACETAMINOPHEN 5; 325 MG/1; MG/1
1 TABLET ORAL
Qty: 12 TABLET | Refills: 0 | Status: SHIPPED | OUTPATIENT
Start: 2022-05-09 | End: 2022-05-12

## 2022-05-09 RX ORDER — LANOLIN ALCOHOL/MO/W.PET/CERES
325 CREAM (GRAM) TOPICAL EVERY OTHER DAY
Qty: 30 TABLET | Refills: 1 | Status: SHIPPED | OUTPATIENT
Start: 2022-05-09

## 2022-05-09 RX ORDER — IBUPROFEN 800 MG/1
800 TABLET ORAL
Qty: 30 TABLET | Refills: 1 | Status: SHIPPED | OUTPATIENT
Start: 2022-05-10

## 2022-05-09 RX ADMIN — KETOROLAC TROMETHAMINE 30 MG: 30 INJECTION, SOLUTION INTRAMUSCULAR at 08:47

## 2022-05-09 RX ADMIN — KETOROLAC TROMETHAMINE 30 MG: 30 INJECTION, SOLUTION INTRAMUSCULAR at 00:32

## 2022-05-09 RX ADMIN — OXYCODONE AND ACETAMINOPHEN 2 TABLET: 5; 325 TABLET ORAL at 04:42

## 2022-05-09 NOTE — PROGRESS NOTES
0730- Bedside and Verbal shift change report given to Hakeem (oncoming nurse) by Marleen Osman (offgoing nurse). Report included the following information SBAR, Intake/Output, MAR and Recent Results. 2508- pt medicated per MAR. Shift assessment complete. IV removed    0955- pt breastfeeding    4242- KO'LaverneMD notified of epds score of 12    1115- I have reviewed discharge instructions with the patient. The patient verbalized understanding. AVS given and provided. Bands verified. Patient armband removed and shredded.  Pt educated on H/P faxed to pediatrician

## 2022-05-09 NOTE — DISCHARGE INSTRUCTIONS
POST DELIVERY DISCHARGE INSTRUCTIONS    Name: Ana Griffith  YOB: 2000  Primary Diagnosis: Active Problems:    S/P  section (2022)      Arrest of descent, delivered, current hospitalization (2022)        General:     Diet/Diet Restrictions:  Eight 8-ounce glasses of fluid daily (water, juices); avoid excessive caffeine intake. Meals/snacks as desired which are high in fiber and carbohydrates and low in fat and cholesterol. Physical Activity / Restrictions / Safety:     Avoid heavy lifting, no more that 8 lbs. For 2-3 weeks; limit use of stairs to 2 times daily for the first week home. No driving for one week. Avoid intercourse 4-6 weeks, no douching or tampon use. Check with obstetrician before starting or resuming an exercise program.         Discharge Instructions/Special Treatment/Home Care Needs:     Continue prenatal vitamins. Continue to use squirt bottle with warm water on your episiotomy after each bathroom use until bleeding stops. If steri-strips applied to your incision, remove in 7-10 days. Call your doctor for the following:     Fever over 101 degrees by mouth. Vaginal bleeding heavier than a normal menstrual period or clot larger than a golf ball. Red streaks or increased swelling of legs, painful red streaks on your breast.  Painful urination, constipation and increased pain or swelling or discharge with your incision. If you feel extremely anxious or overwhelmed. If you have thoughts of harming yourself and/or your baby. Pain Management:     Pain Management:   Take Acetaminophen (Tylenol) or Ibuprofen (Advil, Motrin), as directed for pain. Use a warm Sitz bath 3 times daily to relieve episiotomy or hemorrhoidal discomfort. Heating pad to  incision as needed. For hemorrhoidal discomfort, use Tucks and Anusol cream as needed and directed. Follow-Up Care:      These are general instructions for a healthy lifestyle:    No smoking/ No tobacco products/ Avoid exposure to second hand smoke    Surgeon General's Warning:  Quitting smoking now greatly reduces serious risk to your health. Obesity, smoking, and sedentary lifestyle greatly increases your risk for illness    A healthy diet, regular physical exercise & weight monitoring are important for maintaining a healthy lifestyle    Recognize signs and symptoms of STROKE:    F-face looks uneven    A-arms unable to move or move unevenly    S-speech slurred or non-existent    T-time-call 911 as soon as signs and symptoms begin-DO NOT go       Back to bed or wait to see if you get better-TIME IS BRAIN. Patient Education         Section: What to Expect at Home  Your Recovery     A  section, or , is surgery to deliver your baby through a cut that the doctor makes in your lower belly and uterus. The cut is called an incision. You may have some pain in your lower belly and need pain medicine for 1 to 2 weeks. You can expect some vaginal bleeding for several weeks. You will probably need about 6 weeks to fully recover. It's important to take it easy while the incision heals. Avoid heavy lifting, strenuous activities, and exercises that strain the belly muscles while you recover. Ask a family member or friend for help with housework, cooking, and shopping. This care sheet gives you a general idea about how long it will take for you to recover. But each person recovers at a different pace. Follow the steps below to get better as quickly as possible. How can you care for yourself at home? Activity    · Rest when you feel tired. Getting enough sleep will help you recover.     · Try to walk each day. Start by walking a little more than you did the day before. Bit by bit, increase the amount you walk.  Walking boosts blood flow and helps prevent pneumonia, constipation, and blood clots.     · Avoid strenuous activities, such as bicycle riding, jogging, weightlifting, and aerobic exercise, for 6 weeks or until your doctor says it is okay.     · Until your doctor says it is okay, do not lift anything heavier than your baby.     · Do not do sit-ups or other exercises that strain the belly muscles for 6 weeks or until your doctor says it is okay.     · Hold a pillow over your incision when you cough or take deep breaths. This will support your belly and decrease your pain.     · You may shower as usual. Pat the incision dry when you are done.     · You will have some vaginal bleeding. Wear sanitary pads. Do not douche or use tampons until your doctor says it is okay.     · Ask your doctor when you can drive again.     · You will probably need to take at least 6 weeks off work. It depends on the type of work you do and how you feel.     · Ask your doctor when it is okay for you to have sex. Diet    · You can eat your normal diet. If your stomach is upset, try bland, low-fat foods like plain rice, broiled chicken, toast, and yogurt.     · Drink plenty of fluids (unless your doctor tells you not to).     · You may notice that your bowel movements are not regular right after your surgery. This is common. Try to avoid constipation and straining with bowel movements. You may want to take a fiber supplement every day. If you have not had a bowel movement after a couple of days, ask your doctor about taking a mild laxative.     · If you are breastfeeding, limit alcohol. Alcohol can cause a lack of energy and other health problems for the baby when a breastfeeding woman drinks heavily. It can also get in the way of a mom's ability to feed her baby or to care for the child in other ways. There isn't a lot of research about exactly how much alcohol can harm a baby. Having no alcohol is the safest choice for your baby. If you choose to have a drink now and then, have only one drink, and limit the number of occasions that you have a drink.  Wait to breastfeed at least 2 hours after you have a drink to reduce the amount of alcohol the baby may get in the milk. Medicines    · Your doctor will tell you if and when you can restart your medicines. You will also get instructions about taking any new medicines.     · If you take aspirin or some other blood thinner, ask your doctor if and when to start taking it again. Make sure that you understand exactly what your doctor wants you to do.     · Take pain medicines exactly as directed. ? If the doctor gave you a prescription medicine for pain, take it as prescribed. ? If you are not taking a prescription pain medicine, ask your doctor if you can take an over-the-counter medicine.     · If you think your pain medicine is making you sick to your stomach:  ? Take your medicine after meals (unless your doctor has told you not to). ? Ask your doctor for a different pain medicine.     · If your doctor prescribed antibiotics, take them as directed. Do not stop taking them just because you feel better. You need to take the full course of antibiotics. Incision care    · If you have strips of tape on the incision, leave the tape on for a week or until it falls off.     · Wash the area daily with warm, soapy water, and pat it dry. Don't use hydrogen peroxide or alcohol, which can slow healing. You may cover the area with a gauze bandage if it weeps or rubs against clothing. Change the bandage every day.     · Keep the area clean and dry. Other instructions    · If you breastfeed your baby, you may be more comfortable while you are healing if you don't rest your baby on your belly. Try tucking your baby under your arm, with your baby's body along the side you will be feeding on. Support your baby's upper body with your arm. With that hand you can control your baby's head to bring your baby's mouth to your breast. This is sometimes called the football hold. Follow-up care is a key part of your treatment and safety.  Be sure to make and go to all appointments, and call your doctor if you are having problems. It's also a good idea to know your test results and keep a list of the medicines you take. When should you call for help? Share this information with your partner, family, or a friend. They can help you watch for warning signs. Call 911  anytime you think you may need emergency care. For example, call if:    · You have thoughts of harming yourself, your baby, or another person.     · You passed out (lost consciousness).     · You have chest pain, are short of breath, or cough up blood.     · You have a seizure. Call your doctor now or seek immediate medical care if:    · You have loose stitches, or your incision comes open.     · You have signs of hemorrhage (too much bleeding), such as:  ? Heavy vaginal bleeding. This means that you are soaking through one or more pads in an hour. Or you pass blood clots bigger than an egg. ? Feeling dizzy or lightheaded, or you feel like you may faint. ? Feeling so tired or weak that you cannot do your usual activities. ? A fast or irregular heartbeat. ? New or worse belly pain.     · You have symptoms of infection, such as:  ? Increased pain, swelling, warmth, or redness. ? Red streaks leading from the incision. ? Pus draining from the incision. ? A fever. ? Vaginal discharge that smells bad.  ? New or worse belly pain.     · You have symptoms of a blood clot in your leg (called a deep vein thrombosis), such as:  ? Pain in your calf, back of the knee, thigh, or groin. ? Redness and swelling in your leg or groin.     · You have signs of preeclampsia, such as:  ? Sudden swelling of your face, hands, or feet. ? New vision problems (such as dimness, blurring, or seeing spots). ? A severe headache.    Watch closely for changes in your health, and be sure to contact your doctor if:    · Your vaginal bleeding isn't decreasing.     · You feel sad, anxious, or hopeless for more than a few days.     · You are having problems with your breasts or breastfeeding. Where can you learn more? Go to http://www.[employer].com/  Enter M806 in the search box to learn more about \" Section: What to Expect at Home. \"  Current as of: 2021               Content Version: 13.2  © 9428-8296 [employer]. Care instructions adapted under license by [employer] (which disclaims liability or warranty for this information). If you have questions about a medical condition or this instruction, always ask your healthcare professional. [place] [address] any warranty or liability for your use of this information.

## 2022-05-09 NOTE — PROGRESS NOTES
1930 Received care of mother in room, no distress, call bell in reach,  Bonding well, family @ bedside, showered and tolerated well  2045 nursed well  2300 BEDSIDE_VERBAL_RECORDED_WRITTEN: shift change report given to Gilda Fuentes (oncoming nurse) by Angela Alves (offgoing nurse). Report given with SBAR, Kardex and MAR.

## 2022-05-09 NOTE — PROGRESS NOTES
2300 Bedside shift change report given to Collette Dais, RN (oncoming nurse) by Lorraine Hill RN (offgoing nurse). Report included the following information SBAR, Intake/Output, MAR and Recent Results. 0032 medication given as scheduled; see MAR. Infant awake in bassinet, given to pt to breastfeed. 0200 Rounding complete, no needs at this time. 0400 Pt sleeping with respirations even and unlaboured. 5223 Medication given as ordered; see MAR. Pt breastfeeding infant at this time. 0700 Pt ambulated to the bathroom; assisted with abdominal binder and heat packs. 0730 Bedside shift change report given to Mira Desir RN (oncoming nurse) by Collette Dais, RN (offgoing nurse). Report included the following information SBAR, Intake/Output, MAR and Recent Results.

## 2022-05-09 NOTE — DISCHARGE SUMMARY
Obstetrical Discharge Summary     Name: Rachel Zee MRN: [id number]  SSN: [id number]    YOB: 2000  Age: 23 y.o. Sex: female      Admit Date: 2022    Discharge Date: 2022    Admitting Physician: Jackie Alvarez MD     Attending Physician:  Ami Anthony MD     Discharge Diagnoses:   Information for the patient's :  Kelli Pruitt [[id number]]   Delivery of a 3.52 kg female infant via , Low Transverse on 2022 at 4:57 PM  by Julieta Delvalle. Apgars were 8  and 9 . Additional Diagnoses: s/p  for arrest of descent  Problem List as of 2022 Date Reviewed: 2022          Codes Class Noted - Resolved    S/P  section ICD-10-CM: Z98.891  ICD-9-CM: V45.89  2022 - Present        Arrest of descent, delivered, current hospitalization ICD-10-CM: O62.1  ICD-9-CM: 661.11  2022 - Present        Other chest pain ICD-10-CM: R07.89  ICD-9-CM: 786.59  2018 - Present        RESOLVED: 37 weeks gestation of pregnancy ICD-10-CM: Z3A.37  ICD-9-CM: V22.2  2022 - 2022            No results found for: RUBELLAEXT, GRBSEXT  Recent Labs     22  0255   HGB 9.5*       Hospital Course: Normal hospital course following the delivery. Disposition: home    Discharge Condition: Stable    Patient Instructions:   Current Discharge Medication List      START taking these medications    Details   oxyCODONE-acetaminophen (PERCOCET) 5-325 mg per tablet Take 1 Tablet by mouth every six (6) hours as needed for Pain for up to 3 days. Max Daily Amount: 4 Tablets. Qty: 12 Tablet, Refills: 0    Associated Diagnoses: S/P  section      ibuprofen (MOTRIN) 800 mg tablet Take 1 Tablet by mouth every eight (8) hours as needed for Pain. Qty: 30 Tablet, Refills: 1      ferrous sulfate 325 mg (65 mg iron) tablet Take 1 Tablet by mouth every other day.   Qty: 30 Tablet, Refills: 1         CONTINUE these medications which have NOT CHANGED    Details B.infantis-B.ani-B.long-B.bifi (Probiotic 4X) 10-15 mg TbEC Take 3 Units by mouth daily. lidocaine 4 % patch Apply patch to back Apply patch to the affected area for 12 hours a day and remove for 12 hours a day. Qty: 14 Patch, Refills: 0      hydrocortisone (ANUSOL-HC) 25 mg supp Insert 1 Suppository into rectum every twelve (12) hours. Qty: 100 Suppository, Refills: 2    Associated Diagnoses: Hemorrhoids, unspecified hemorrhoid type      cetirizine-psuedoePHEDrine (ZYRTEC-D) 5-120 mg per tablet Take 1 Tab by mouth two (2) times a day. Qty: 24 Tab, Refills: 4    Associated Diagnoses: Mild intermittent asthma, unspecified whether complicated      VENTOLIN HFA 90 mcg/actuation inhaler INHALE 1 PUFF INHALE EVERY 4 HOURS AS NEEDED (PRN)  Refills: 0      omega 3-dha-epa-fish oil (FISH OIL) 100-160-1,000 mg cap Take  by mouth. MV-MIN/FOLIC/VIT Q/XMCOZ/WJI81 (DAILY MULTIVITAMIN PO) Take  by mouth. MAGNESIUM PO Take  by mouth. inhalational spacing device Use every time you use your inhaler  Qty: 1 Device, Refills: 0    Associated Diagnoses: Mild intermittent asthma, unspecified whether complicated             Reference my discharge instructions. Follow-up Appointments   Procedures    FOLLOW UP VISIT Appointment in: Two Weeks     Standing Status:   Standing     Number of Occurrences:   1     Order Specific Question:   Appointment in     Answer:    Two Weeks        Signed By:  Bhavna Wilson MD     May 9, 2022                       BST

## 2022-05-09 NOTE — PROGRESS NOTES
Post-Operative  Day 2    Evelin Rodriguez       Assessment:   Hospital Problems  Date Reviewed: 2022          Codes Class Noted POA    S/P  section ICD-10-CM: I49.760  ICD-9-CM: V45.89  2022 No        Arrest of descent, delivered, current hospitalization ICD-10-CM: O62.1  ICD-9-CM: 661.11  2022 No        37 weeks gestation of pregnancy ICD-10-CM: Z3A.37  ICD-9-CM: V22.2  2022 Yes            Post-Op day 2, doing well    Plan:   1. Discharge home today  2. Follow up in office in 2 and 6 weeks with Lazarus Critchley, MD  3. Post partum activity/wound care advised, diet as tolerated  4. Discharge Medications: ibuprofen, percocet and medications prior to admission      Information for the patient's :  Oscar Fam [[id number]]   , Low Transverse    Patient doing well without significant complaint. Tolerating diet, passing flatus, voiding and ambulating without difficulty. No BM yet. Patient is breast feeding. Pt desires discharge today. Current Facility-Administered Medications   Medication Dose Route Frequency    sodium chloride (NS) flush 5-40 mL  5-40 mL IntraVENous Q8H    ketorolac (TORADOL) injection 30 mg  30 mg IntraVENous Q6H    oxytocin (PITOCIN) 30 units/500 ml NS  0-20 rosalind-units/min IntraVENous TITRATE       Vitals:  Visit Vitals  BP (!) 110/57 (BP 1 Location: Left arm, BP Patient Position: At rest)   Pulse 100   Temp 98.4 °F (36.9 °C)   Resp 18   Ht 5' 1\" (1.549 m)   Wt 98.4 kg (217 lb)   SpO2 100%   Breastfeeding Unknown   BMI 41.00 kg/m²     Temp (24hrs), Av.2 °F (36.8 °C), Min:97.7 °F (36.5 °C), Max:98.7 °F (37.1 °C)        Exam:        Patient without distress. Abdomen, bowel sounds present, soft, expected tenderness, fundus firm                Wound incision clean, dry and intact               Lower extremities are negative for swelling, cords or tenderness.     Labs:   Lab Results   Component Value Date/Time    WBC 7.8 05/06/2022 08:59 AM    WBC 5.9 01/14/2022 06:30 AM    HGB 9.5 (L) 05/08/2022 02:55 AM    HGB 11.5 (L) 05/06/2022 08:59 AM    HGB 12.1 01/14/2022 06:30 AM    HCT 28.9 (L) 05/08/2022 02:55 AM    HCT 36.5 05/06/2022 08:59 AM    HCT 36.6 01/14/2022 06:30 AM    PLATELET 359 44/94/5637 08:59 AM    PLATELET 128 68/71/2839 06:30 AM       No results found for this or any previous visit (from the past 24 hour(s)).

## (undated) DEVICE — LINER,SEMI-RIGID,3000CC,50EA/CS: Brand: MEDLINE

## (undated) DEVICE — SUTURE VCRL SZ 0 L36IN ABSRB UD L36MM CT-1 1/2 CIR J946H

## (undated) DEVICE — AGENT HEMSTAT 3GM PURIFIED PLNT STARCH PWD ABSRB ARISTA AH

## (undated) DEVICE — BSMI CSECTION BIRTHING PACK: Brand: MEDLINE INDUSTRIES, INC.

## (undated) DEVICE — STRAP,POSITIONING,KNEE/BODY,FOAM,4X60": Brand: MEDLINE

## (undated) DEVICE — GLOVE ORANGE PI 8   MSG9080

## (undated) DEVICE — STAPLER SKIN SQ 30 ABSRB STPL DISP INSORB

## (undated) DEVICE — INTENDED FOR TISSUE SEPARATION, AND OTHER PROCEDURES THAT REQUIRE A SHARP SURGICAL BLADE TO PUNCTURE OR CUT.: Brand: BARD-PARKER ® CARBON RIB-BACK BLADES

## (undated) DEVICE — SOL IRRIGATION INJ NACL 0.9% 500ML BTL

## (undated) DEVICE — GARMENT,MEDLINE,DVT,INT,CALF,MED, GEN2: Brand: MEDLINE